# Patient Record
Sex: FEMALE | HISPANIC OR LATINO | Employment: FULL TIME | URBAN - METROPOLITAN AREA
[De-identification: names, ages, dates, MRNs, and addresses within clinical notes are randomized per-mention and may not be internally consistent; named-entity substitution may affect disease eponyms.]

---

## 2018-01-05 ENCOUNTER — NON-PROVIDER VISIT (OUTPATIENT)
Dept: OBGYN | Facility: CLINIC | Age: 28
End: 2018-01-05
Payer: MEDICAID

## 2018-01-05 DIAGNOSIS — Z32.01 PREGNANCY EXAMINATION OR TEST, POSITIVE RESULT: ICD-10-CM

## 2018-01-05 LAB
INT CON NEG: NEGATIVE
INT CON POS: POSITIVE
POC URINE PREGNANCY TEST: POSITIVE

## 2018-01-05 PROCEDURE — 81025 URINE PREGNANCY TEST: CPT | Performed by: OBSTETRICS & GYNECOLOGY

## 2018-01-23 ENCOUNTER — INITIAL PRENATAL (OUTPATIENT)
Dept: OBGYN | Facility: CLINIC | Age: 28
End: 2018-01-23
Payer: MEDICAID

## 2018-01-23 VITALS
DIASTOLIC BLOOD PRESSURE: 58 MMHG | SYSTOLIC BLOOD PRESSURE: 110 MMHG | BODY MASS INDEX: 22.15 KG/M2 | WEIGHT: 125 LBS | HEIGHT: 63 IN

## 2018-01-23 DIAGNOSIS — N93.8 DUB (DYSFUNCTIONAL UTERINE BLEEDING): ICD-10-CM

## 2018-01-23 PROCEDURE — 76830 TRANSVAGINAL US NON-OB: CPT | Performed by: OBSTETRICS & GYNECOLOGY

## 2018-01-23 PROCEDURE — 99203 OFFICE O/P NEW LOW 30 MIN: CPT | Mod: 25 | Performed by: OBSTETRICS & GYNECOLOGY

## 2018-01-23 NOTE — PROGRESS NOTES
"Confirmation of pregnancy;    Melissa Le is a 27 y.o.  here for dysfunctional uterine bleeding. Patient denies bleeding or cramps.    /58   Ht 1.6 m (5' 3\")   Wt 56.7 kg (125 lb)   BMI 22.14 kg/m²     Transvaginal ultrasound; as performed and read by myself; intrauterine gestational sac containing dotson pregnancy positive cardiac and fetal motion, crown-rump length consistent with 8 weeks 4 days, EDC 18, no free pelvic fluid, no adnexal masses        Impression;  Viable pregnancy 8 weeks 4 days    Plan;  Followup with nurse midwife in 2 weeks for initial OB            "

## 2018-01-24 ENCOUNTER — TELEPHONE (OUTPATIENT)
Dept: OBGYN | Facility: CLINIC | Age: 28
End: 2018-01-24

## 2018-01-24 NOTE — TELEPHONE ENCOUNTER
Returned pt's phone call. (Pt had  done yesterday 01/23/2018 atT). Pt stated that she is having symptoms of a yeast infection, stated having itching and white cottage cheeses discharge. Stated she had yeast infection in 06/2017 and she treated with over thecounter Monistat 7 cream. Pt wanted to know if it is safe to treated now that she is pregnant. Per consult with Aure Henley D.N.P. it is safe to treat in pregnancy with Monistat 7 cream. Instructions given to pt. I also Informed pt that if her symptoms do not improve to give us a call back. Pt verbalized understanding and had no further questions.

## 2018-02-14 ENCOUNTER — INITIAL PRENATAL (OUTPATIENT)
Dept: OBGYN | Facility: CLINIC | Age: 28
End: 2018-02-14
Payer: MEDICAID

## 2018-02-14 ENCOUNTER — HOSPITAL ENCOUNTER (OUTPATIENT)
Facility: MEDICAL CENTER | Age: 28
End: 2018-02-14
Attending: NURSE PRACTITIONER
Payer: COMMERCIAL

## 2018-02-14 VITALS — DIASTOLIC BLOOD PRESSURE: 60 MMHG | WEIGHT: 126 LBS | SYSTOLIC BLOOD PRESSURE: 90 MMHG | BODY MASS INDEX: 22.32 KG/M2

## 2018-02-14 DIAGNOSIS — Z34.81 ENCOUNTER FOR SUPERVISION OF OTHER NORMAL PREGNANCY IN FIRST TRIMESTER: ICD-10-CM

## 2018-02-14 DIAGNOSIS — Z34.81 ENCOUNTER FOR SUPERVISION OF OTHER NORMAL PREGNANCY IN FIRST TRIMESTER: Primary | ICD-10-CM

## 2018-02-14 DIAGNOSIS — Z34.81 ENCOUNTER FOR SUPERVISION OF OTHER NORMAL PREGNANCY, FIRST TRIMESTER: ICD-10-CM

## 2018-02-14 PROBLEM — Z34.91 ENCOUNTER FOR SUPERVISION OF NORMAL PREGNANCY IN FIRST TRIMESTER: Status: ACTIVE | Noted: 2018-02-14

## 2018-02-14 LAB
APPEARANCE UR: NORMAL
BILIRUB UR STRIP-MCNC: NORMAL MG/DL
COLOR UR AUTO: NORMAL
GLUCOSE UR STRIP.AUTO-MCNC: NEGATIVE MG/DL
KETONES UR STRIP.AUTO-MCNC: NEGATIVE MG/DL
LEUKOCYTE ESTERASE UR QL STRIP.AUTO: NEGATIVE
NITRITE UR QL STRIP.AUTO: NEGATIVE
PH UR STRIP.AUTO: 7.5 [PH] (ref 5–8)
PROT UR QL STRIP: NORMAL MG/DL
RBC UR QL AUTO: NEGATIVE
SP GR UR STRIP.AUTO: 1.01
UROBILINOGEN UR STRIP-MCNC: NORMAL MG/DL

## 2018-02-14 PROCEDURE — 59401 PR NEW OB VISIT: CPT | Performed by: NURSE PRACTITIONER

## 2018-02-14 PROCEDURE — 87591 N.GONORRHOEAE DNA AMP PROB: CPT

## 2018-02-14 PROCEDURE — 81002 URINALYSIS NONAUTO W/O SCOPE: CPT | Performed by: NURSE PRACTITIONER

## 2018-02-14 PROCEDURE — 88175 CYTOPATH C/V AUTO FLUID REDO: CPT

## 2018-02-14 PROCEDURE — 87491 CHLMYD TRACH DNA AMP PROBE: CPT

## 2018-02-14 ASSESSMENT — ENCOUNTER SYMPTOMS
MUSCULOSKELETAL NEGATIVE: 1
PSYCHIATRIC NEGATIVE: 1
CARDIOVASCULAR NEGATIVE: 1
RESPIRATORY NEGATIVE: 1
GASTROINTESTINAL NEGATIVE: 1
NEUROLOGICAL NEGATIVE: 1
EYES NEGATIVE: 1
CONSTITUTIONAL NEGATIVE: 1

## 2018-02-14 NOTE — PROGRESS NOTES
Pt. Here for NOB visit today.  # [phone]  First prenatal care  Pt. States having green discharge  Pharmacy verified  Pt desires AFP at 15 weeks  Pt states got flu vaccine at place of work in 2017   Pt last pap was early 2017, pt states neg  Pt would like to think about the CF testing.

## 2018-02-14 NOTE — LETTER
Cystic Fibrosis Carrier Testing  Melissa Le    The following information is about a blood test that can be done to determine if you and/or your partner carry the gene for cystic fibrosis.    WHAT IS CYSTIC FIBROSIS?  · Cystic fibrosis (CF) is an inherited disease that affects more than 25,000 American children and young adults.  · Symptoms of CF vary but include lung congestion, pneumonia, diarrhea and poor growth.  Most people with CF have severe medical problems and some die at a young age.  Others have so few symptoms they are unaware they have CF.  · CF does not affect intelligence.  · Although there is no cure for CF at this time, scientists are making progress in improving treatment and in searching for a cure.  In the past many people with CF  at a very young age.  Today, many are living into their 20’s and 30’s.    IS THERE A CHANCE MY BABY COULD HAVE CYSTIC FIBROSIS?  · You can have a child with CF even if there is no history in your family (see chart below).  · CF testing can help determine if you are a carrier and at risk to have a child with CF.  Note: if both parents are carriers, there is a 1 in 4 (25%) chance with each pregnancy that they will have a child with CF.  · Carriers have one normal CF gene and one altered CF gene.  · People with CF have two altered CF genes.  · Most people have two normal copies of the CF gene.    Approximate risk that a couple with no family history of cystic fibrosis will have a child with cystic fibrosis:    Ethnic background / Risk     couple:  1 in 2,500   couple:  1 in 15,000            couple:  1 in 8,000     American couple:  1 in 32,000     WHAT TESTING IS AVAILABLE?  · There is a blood test that can be done to find out if you or your partner is a carrier.  · It is important to understand that CF carrier testing does not detect all CF carriers.  · If the test shows that you are both CF carriers, you  unborn baby can be tested to find out if the baby has CF.    HOW MUCH DOES IT COST TO HAVE CYSTIC FIBROSIS CARRIER TESTING?  · Cost and insurance coverage for CF carrier testing vary depending upon the laboratory used and your insurance policy.  · The average cost for CF carrier testing is $300 per person.  · Your genetic counselor can provide you with more information about cystic fibrosis carrier testing.    _____  Yes, I am interested in discussing carrier testing with a genetic counselor.    _____  No, I am not interested in CF carrier testing or in receiving more information about CF carrier testing.      Client signature: ________________________________________  2/14/2018

## 2018-02-14 NOTE — PROGRESS NOTES
Subjective:      S:  Melissa Le is a 27 y.o.  female  @ EGA: 11w6d NIGEL: Estimated Date of Delivery: 18  per LMP who presents for her new OB exam.  She reports dicharge that is white and mucus-y, no burning or itching.  Desires AFP.  Considering CF.  Reports no FM, VB, LOF, or cramping.  Denies dysuria.  Pt is  and lives with FOB. Works as dietary aide.  Pregnancy is desired.    Declines Centering Pregnancy.    O:    Vitals:    18 1304   BP: (!) 90/60   Weight: 57.2 kg (126 lb)    See H&P Prenatal Physical.  Wet mount: negative        FHTs: 160        Fundal ht: 11cm     A:   1.  IUP @ 11w6d NIGEL: Estimated Date of Delivery: 18 per LMP         2.  S=D        3.    Patient Active Problem List    Diagnosis Date Noted   • Encounter for supervision of normal pregnancy in first trimester 2018         P:  1.  GC/CT done. Pap done.         2.  Prenatal labs ordered - lab slip given        3.  Discussed PNV, diet, and adequate water intake        4.  NOB packet given        5.  Return to office in 4 wks        6.  Complete OB US in 8-9 wks          HPI    Review of Systems   Constitutional: Negative.    HENT: Negative.    Eyes: Negative.    Respiratory: Negative.    Cardiovascular: Negative.    Gastrointestinal: Negative.    Genitourinary: Negative.         Uterus enlarged   Musculoskeletal: Negative.    Skin: Negative.    Neurological: Negative.    Endo/Heme/Allergies: Negative.    Psychiatric/Behavioral: Negative.           Objective:     BP (!) 90/60   Wt 57.2 kg (126 lb)   LMP 2017 (Approximate)   BMI 22.32 kg/m²      Physical Exam   Constitutional: She is oriented to person, place, and time. She appears well-developed and well-nourished.   HENT:   Head: Normocephalic and atraumatic.   Eyes: Pupils are equal, round, and reactive to light.   Neck: Normal range of motion. Neck supple.   Cardiovascular: Normal rate, regular rhythm and normal heart  sounds.    Pulmonary/Chest: Effort normal and breath sounds normal.   Abdominal: Soft.   Genitourinary: Vagina normal and uterus normal.   Musculoskeletal: Normal range of motion.   Neurological: She is alert and oriented to person, place, and time. She has normal reflexes.   Skin: Skin is warm and dry.   Psychiatric: She has a normal mood and affect. Her behavior is normal. Judgment and thought content normal.   Nursing note and vitals reviewed.              Assessment/Plan:     1. Encounter for supervision of other normal pregnancy in first trimester    - PREG CNTR PRENATAL PN; Future  - THINPREP RFLX HPV ASCUS W/CTNG; Future  - US-OB 2ND 3RD TRI COMPLETE; Future    2. Encounter for supervision of other normal pregnancy, first trimester    - POCT Urinalysis

## 2018-02-15 LAB
C TRACH DNA GENITAL QL NAA+PROBE: NEGATIVE
CYTOLOGY REG CYTOL: NORMAL
N GONORRHOEA DNA GENITAL QL NAA+PROBE: NEGATIVE
SPECIMEN SOURCE: NORMAL

## 2018-02-20 ENCOUNTER — HOSPITAL ENCOUNTER (OUTPATIENT)
Dept: LAB | Facility: MEDICAL CENTER | Age: 28
End: 2018-02-20
Attending: NURSE PRACTITIONER
Payer: COMMERCIAL

## 2018-02-20 DIAGNOSIS — Z34.81 ENCOUNTER FOR SUPERVISION OF OTHER NORMAL PREGNANCY IN FIRST TRIMESTER: ICD-10-CM

## 2018-02-20 LAB
ABO GROUP BLD: NORMAL
APPEARANCE UR: ABNORMAL
BACTERIA #/AREA URNS HPF: ABNORMAL /HPF
BASOPHILS # BLD AUTO: 0.8 % (ref 0–1.8)
BASOPHILS # BLD: 0.06 K/UL (ref 0–0.12)
BILIRUB UR QL STRIP.AUTO: NEGATIVE
BLD GP AB SCN SERPL QL: NORMAL
COLOR UR: YELLOW
CULTURE IF INDICATED INDCX: YES UA CULTURE
EOSINOPHIL # BLD AUTO: 0.11 K/UL (ref 0–0.51)
EOSINOPHIL NFR BLD: 1.4 % (ref 0–6.9)
EPI CELLS #/AREA URNS HPF: ABNORMAL /HPF
ERYTHROCYTE [DISTWIDTH] IN BLOOD BY AUTOMATED COUNT: 41.4 FL (ref 35.9–50)
GLUCOSE UR STRIP.AUTO-MCNC: NEGATIVE MG/DL
HBV SURFACE AG SER QL: NEGATIVE
HCT VFR BLD AUTO: 37.8 % (ref 37–47)
HGB BLD-MCNC: 12.5 G/DL (ref 12–16)
HIV 1+2 AB+HIV1 P24 AG SERPL QL IA: NON REACTIVE
HYALINE CASTS #/AREA URNS LPF: ABNORMAL /LPF
IMM GRANULOCYTES # BLD AUTO: 0.03 K/UL (ref 0–0.11)
IMM GRANULOCYTES NFR BLD AUTO: 0.4 % (ref 0–0.9)
KETONES UR STRIP.AUTO-MCNC: NEGATIVE MG/DL
LEUKOCYTE ESTERASE UR QL STRIP.AUTO: ABNORMAL
LYMPHOCYTES # BLD AUTO: 1.33 K/UL (ref 1–4.8)
LYMPHOCYTES NFR BLD: 17.4 % (ref 22–41)
MCH RBC QN AUTO: 29.6 PG (ref 27–33)
MCHC RBC AUTO-ENTMCNC: 33.1 G/DL (ref 33.6–35)
MCV RBC AUTO: 89.6 FL (ref 81.4–97.8)
MICRO URNS: ABNORMAL
MONOCYTES # BLD AUTO: 0.58 K/UL (ref 0–0.85)
MONOCYTES NFR BLD AUTO: 7.6 % (ref 0–13.4)
NEUTROPHILS # BLD AUTO: 5.53 K/UL (ref 2–7.15)
NEUTROPHILS NFR BLD: 72.4 % (ref 44–72)
NITRITE UR QL STRIP.AUTO: NEGATIVE
NRBC # BLD AUTO: 0 K/UL
NRBC BLD-RTO: 0 /100 WBC
PH UR STRIP.AUTO: 6 [PH]
PLATELET # BLD AUTO: 262 K/UL (ref 164–446)
PMV BLD AUTO: 10.5 FL (ref 9–12.9)
PROT UR QL STRIP: NEGATIVE MG/DL
RBC # BLD AUTO: 4.22 M/UL (ref 4.2–5.4)
RBC # URNS HPF: ABNORMAL /HPF
RBC UR QL AUTO: NEGATIVE
RH BLD: NORMAL
RUBV AB SER QL: 12.2 IU/ML
SP GR UR STRIP.AUTO: 1.02
TREPONEMA PALLIDUM IGG+IGM AB [PRESENCE] IN SERUM OR PLASMA BY IMMUNOASSAY: NON REACTIVE
UROBILINOGEN UR STRIP.AUTO-MCNC: 0.2 MG/DL
WBC # BLD AUTO: 7.6 K/UL (ref 4.8–10.8)
WBC #/AREA URNS HPF: ABNORMAL /HPF

## 2018-02-20 PROCEDURE — 36415 COLL VENOUS BLD VENIPUNCTURE: CPT

## 2018-02-20 PROCEDURE — 85025 COMPLETE CBC W/AUTO DIFF WBC: CPT

## 2018-02-20 PROCEDURE — 86901 BLOOD TYPING SEROLOGIC RH(D): CPT

## 2018-02-20 PROCEDURE — 86780 TREPONEMA PALLIDUM: CPT

## 2018-02-20 PROCEDURE — 87340 HEPATITIS B SURFACE AG IA: CPT

## 2018-02-20 PROCEDURE — 86762 RUBELLA ANTIBODY: CPT

## 2018-02-20 PROCEDURE — 81001 URINALYSIS AUTO W/SCOPE: CPT

## 2018-02-20 PROCEDURE — 86850 RBC ANTIBODY SCREEN: CPT

## 2018-02-20 PROCEDURE — 86900 BLOOD TYPING SEROLOGIC ABO: CPT

## 2018-02-20 PROCEDURE — 87086 URINE CULTURE/COLONY COUNT: CPT

## 2018-02-20 PROCEDURE — 87389 HIV-1 AG W/HIV-1&-2 AB AG IA: CPT

## 2018-02-22 LAB
BACTERIA UR CULT: ABNORMAL
BACTERIA UR CULT: ABNORMAL
SIGNIFICANT IND 70042: ABNORMAL
SITE SITE: ABNORMAL
SOURCE SOURCE: ABNORMAL

## 2018-03-14 ENCOUNTER — ROUTINE PRENATAL (OUTPATIENT)
Dept: OBGYN | Facility: CLINIC | Age: 28
End: 2018-03-14
Payer: MEDICAID

## 2018-03-14 VITALS — BODY MASS INDEX: 23.38 KG/M2 | DIASTOLIC BLOOD PRESSURE: 58 MMHG | WEIGHT: 132 LBS | SYSTOLIC BLOOD PRESSURE: 96 MMHG

## 2018-03-14 DIAGNOSIS — Z34.82 ENCOUNTER FOR SUPERVISION OF OTHER NORMAL PREGNANCY IN SECOND TRIMESTER: ICD-10-CM

## 2018-03-14 PROBLEM — Z34.92 ENCOUNTER FOR SUPERVISION OF NORMAL PREGNANCY IN SECOND TRIMESTER: Status: ACTIVE | Noted: 2018-02-14

## 2018-03-14 PROCEDURE — 90040 PR PRENATAL FOLLOW UP: CPT | Performed by: NURSE PRACTITIONER

## 2018-03-14 ASSESSMENT — PATIENT HEALTH QUESTIONNAIRE - PHQ9: CLINICAL INTERPRETATION OF PHQ2 SCORE: 0

## 2018-03-14 NOTE — PROGRESS NOTES
Ob f/u. +   No VB, LOF or contractions   C/O pt no problems today   Phone number # [phone]  Pharmacy verified with patient  WT= 132 lbs             BP=96/58  US scheduled 4/17/2018 10:00 am

## 2018-03-14 NOTE — PROGRESS NOTES
SUBJECTIVE:  Pt is a 27 y.o.   at 15w6d  gestation. Presents today for follow-up prenatal care. Reports no issues at this time.  Reports fetal movement. Denies cramping/contractions, bleeding or leaking of fluid. Denies dysuria, headaches, N/V, or other issues at this time. Generally feels well today.     OBJECTIVE:  - See prenatal vitals flow  Vitals:    18 0950   BP: (!) 96/58   Weight: 59.9 kg (132 lb)                 ASSESSMENT:   - IUP at 15w6d by LMP which is consistent with 8 week US   - S=D  Patient Active Problem List    Diagnosis Date Noted   • Encounter for supervision of normal pregnancy in second trimester 2018         PLAN:  - AFP ordered  - US scheduled   - S/sx pregnancy and labor warning signs vs general discomforts discussed  - Fetal movements and kick counts reviewed   - Adequate hydration reinforced  - Nutrition/exercise/vitamin education; continued PNV  - S/p Flu vacc at job   - Anticipatory guidance given  - RTC in 4 weeks for follow-up prenatal care

## 2018-03-15 ENCOUNTER — HOSPITAL ENCOUNTER (OUTPATIENT)
Dept: LAB | Facility: MEDICAL CENTER | Age: 28
End: 2018-03-15
Attending: NURSE PRACTITIONER
Payer: COMMERCIAL

## 2018-03-15 DIAGNOSIS — Z34.82 ENCOUNTER FOR SUPERVISION OF OTHER NORMAL PREGNANCY IN SECOND TRIMESTER: ICD-10-CM

## 2018-03-15 PROCEDURE — 36415 COLL VENOUS BLD VENIPUNCTURE: CPT

## 2018-03-15 PROCEDURE — 81511 FTL CGEN ABNOR FOUR ANAL: CPT

## 2018-03-20 LAB
# FETUSES US: NORMAL
AFP MOM SERPL: 0.7
AFP SERPL-MCNC: 25 NG/ML
AGE - REPORTED: 26.8 YR
CURRENT SMOKER: NO
FAMILY MEMBER DISEASES HX: NO
GA METHOD: NORMAL
GA: NORMAL WK
HCG MOM SERPL: 0.72
HCG SERPL-ACNC: NORMAL IU/L
HX OF HEREDITARY DISORDERS: NO
IDDM PATIENT QL: NO
INHIBIN A MOM SERPL: 0.44
INHIBIN A SERPL-MCNC: 79 PG/ML
INTEGRATED SCN PATIENT-IMP: NORMAL
PATHOLOGY STUDY: NORMAL
SPECIMEN DRAWN SERPL: NORMAL
U ESTRIOL MOM SERPL: 1
U ESTRIOL SERPL-MCNC: 0.91 NG/ML

## 2018-03-21 ENCOUNTER — TELEPHONE (OUTPATIENT)
Dept: OBGYN | Facility: CLINIC | Age: 28
End: 2018-03-21

## 2018-03-21 NOTE — TELEPHONE ENCOUNTER
Pt called triage line requesting AFP lab results. AFP was reviewed by Kira De La Paz C.N.M. On 3/20/18. Called pt back and advised her that the results were WNL. Pt had no further questions or concerns.

## 2018-04-11 ENCOUNTER — ROUTINE PRENATAL (OUTPATIENT)
Dept: OBGYN | Facility: CLINIC | Age: 28
End: 2018-04-11

## 2018-04-11 VITALS — BODY MASS INDEX: 23.91 KG/M2 | SYSTOLIC BLOOD PRESSURE: 98 MMHG | DIASTOLIC BLOOD PRESSURE: 60 MMHG | WEIGHT: 135 LBS

## 2018-04-11 DIAGNOSIS — Z34.82 ENCOUNTER FOR SUPERVISION OF OTHER NORMAL PREGNANCY IN SECOND TRIMESTER: ICD-10-CM

## 2018-04-11 PROCEDURE — 90040 PR PRENATAL FOLLOW UP: CPT | Performed by: NURSE PRACTITIONER

## 2018-04-11 NOTE — PROGRESS NOTES
Ob f/u. + fetal movement good  No VB, LOF or contractions   C/O lower back pain. Pt states she is not taking plenty water   Phone number # [phone]  Pharmacy verified with patient  QF=927 lbs              BP=98/60  US scheduled on 04/17/2018 10:00 am

## 2018-04-11 NOTE — PROGRESS NOTES
S) Pt is a 27 y.o.   at 19w6d  gestation. Routine prenatal care today. No complaints today. Discussed adequate hydration during pregnancy as she feels like she is not drinking enough. Has US scheduled 18. Discussed glucose testing next visit.  labor precautions discussed, all questions answered.    Fetal movement Normal  Cramping no  VB no  LOF no   Denies dysuria. Generally feels well today. Good self-care activities identified. Denies headaches, swelling, visual changes, or epigastric pain .     O) Blood pressure (!) 98/60, weight 61.2 kg (135 lb), last menstrual period 2017, unknown if currently breastfeeding.        Labs:       PNL: WNL       GCT: Too early       AFP: normal       GBS: N/A       Pertinent ultrasound -        Scheduled 18    A) IUP at 19w6d       S=D         Patient Active Problem List    Diagnosis Date Noted   • Encounter for supervision of normal pregnancy in second trimester 2018          SVE: deferred         TDAP: no       FLU: yes        BTL: no       : n/a       C/S Consent: n/a       IOL or C/S scheduled: no       LAST PAP: 18- Negative         P) s/s ptl vs general discomforts. Fetal movements reviewed. General ed and anticipatory guidance. Nutrition/exercise/vitamin. Plans breast Plans pp contraception- unsure  Continue PNV.

## 2018-04-17 ENCOUNTER — DATING (OUTPATIENT)
Dept: OBGYN | Facility: CLINIC | Age: 28
End: 2018-04-17

## 2018-04-17 ENCOUNTER — APPOINTMENT (OUTPATIENT)
Dept: RADIOLOGY | Facility: IMAGING CENTER | Age: 28
End: 2018-04-17
Attending: NURSE PRACTITIONER
Payer: COMMERCIAL

## 2018-04-17 DIAGNOSIS — Z34.81 ENCOUNTER FOR SUPERVISION OF OTHER NORMAL PREGNANCY IN FIRST TRIMESTER: ICD-10-CM

## 2018-04-17 DIAGNOSIS — O35.HXX0 SHORT FEMUR OF FETUS ON PRENATAL ULTRASOUND: ICD-10-CM

## 2018-04-17 PROCEDURE — 76805 OB US >/= 14 WKS SNGL FETUS: CPT | Mod: 26 | Performed by: OBSTETRICS & GYNECOLOGY

## 2018-05-09 ENCOUNTER — ROUTINE PRENATAL (OUTPATIENT)
Dept: OBGYN | Facility: CLINIC | Age: 28
End: 2018-05-09
Payer: COMMERCIAL

## 2018-05-09 VITALS — WEIGHT: 139 LBS | BODY MASS INDEX: 24.62 KG/M2 | SYSTOLIC BLOOD PRESSURE: 106 MMHG | DIASTOLIC BLOOD PRESSURE: 62 MMHG

## 2018-05-09 DIAGNOSIS — O35.HXX0 SHORT FEMUR OF FETUS ON PRENATAL ULTRASOUND: ICD-10-CM

## 2018-05-09 DIAGNOSIS — Z34.82 ENCOUNTER FOR SUPERVISION OF OTHER NORMAL PREGNANCY IN SECOND TRIMESTER: Primary | ICD-10-CM

## 2018-05-09 PROCEDURE — 90040 PR PRENATAL FOLLOW UP: CPT | Performed by: NURSE PRACTITIONER

## 2018-05-09 NOTE — PROGRESS NOTES
OB f/u. + fetal movement.  No VB, LOF or UC's.  Good phone # [phone]  3rd trimester lab orders pended and instructions given to patient   Pt concerned about a lump above belly button

## 2018-05-09 NOTE — PROGRESS NOTES
SUBJECTIVE:  Pt is a 27 y.o.   at 23w6d  gestation. Presents today for follow-up prenatal care. Reports no issues at this time.  Reports good  fetal movement. Denies cramping/contractions, bleeding or leaking of fluid. Denies dysuria, headaches, N/V, or other issues at this time. Generally feels well today.     OBJECTIVE:  - See prenatal vitals flow  -   Vitals:    18 1122   BP: 106/62   Weight: 63 kg (139 lb)      Labs - normal prenatal panel. Normal AfP  US - normal fetal survey            ASSESSMENT:   - IUP at 23w6d    - S=D   -   Patient Active Problem List    Diagnosis Date Noted   • Encounter for supervision of normal pregnancy in second trimester 2018     Priority: Medium   • Short femur of fetus on prenatal ultrasound 2018         PLAN:  - S/sx pregnancy and labor warning signs vs general discomforts discussed  - Fetal movements and kick counts reviewed   - Adequate hydration reinforced  - Nutrition/exercise/vitamin education; continued PNV  -Lab slip and instructions for glucose.

## 2018-05-10 ENCOUNTER — TELEPHONE (OUTPATIENT)
Dept: OBGYN | Facility: CLINIC | Age: 28
End: 2018-05-10

## 2018-05-10 NOTE — TELEPHONE ENCOUNTER
Pt called triage line stating she would like someone to call her. Called pt pt states that she had an appt. Yesterday in our clinic and the provider went over her U/S results but she is a little concerned because she was told that the baby's has small arms and legs. Informed pt that this was just an incidental finding it doesn't mean that the baby has dwarfisms or anything and explained to pt in detail about people having different body frames per consult with Aure Wright C.N.M. Pt verbalized understanding and had no further questions or concerns. Pt instructed to keep follow up appt with [facility] on 5/29/18.

## 2018-05-29 ENCOUNTER — ROUTINE PRENATAL (OUTPATIENT)
Dept: OBGYN | Facility: CLINIC | Age: 28
End: 2018-05-29
Payer: COMMERCIAL

## 2018-05-29 VITALS — DIASTOLIC BLOOD PRESSURE: 66 MMHG | BODY MASS INDEX: 25.51 KG/M2 | SYSTOLIC BLOOD PRESSURE: 102 MMHG | WEIGHT: 144 LBS

## 2018-05-29 DIAGNOSIS — Z34.82 ENCOUNTER FOR SUPERVISION OF OTHER NORMAL PREGNANCY IN SECOND TRIMESTER: ICD-10-CM

## 2018-05-29 DIAGNOSIS — O35.HXX0 SHORT FEMUR OF FETUS ON PRENATAL ULTRASOUND: ICD-10-CM

## 2018-05-29 PROCEDURE — 90040 PR PRENATAL FOLLOW UP: CPT | Performed by: NURSE PRACTITIONER

## 2018-05-29 NOTE — PROGRESS NOTES
Ob f/u. + fetal movement good  No VB, LOF or contractions   C/O pt report no problems at this time   Phone number # [phone]  Pharmacy verified with patient  WT= 144 lbs             QC=930/66

## 2018-05-29 NOTE — PROGRESS NOTES
SUBJECTIVE:  Pt is a 27 y.o.   at 26w5d  gestation. Presents today for follow-up prenatal care. Reports no issues at this time.  Reports fetal movement. Denies cramping/contractions, bleeding or leaking of fluid. Denies dysuria, headaches, N/V, or other issues at this time. Generally feels well today.     OBJECTIVE:  - See prenatal vitals flow  -   Vitals:    18 1128   BP: 102/66   Weight: 65.3 kg (144 lb)                 ASSESSMENT:   - IUP at 26w5d    - S=D   -   Patient Active Problem List    Diagnosis Date Noted   • Encounter for supervision of normal pregnancy in second trimester 2018     Priority: Medium   • Short femur of fetus on prenatal ultrasound 2018         PLAN:  - GCT to be done asap   - S/sx pregnancy and labor warning signs vs general discomforts discussed  - Fetal movements and kick counts reviewed   - Adequate hydration reinforced  - Nutrition/exercise/vitamin education; continued PNV  - F/u US ordered for femur humerus length at 28 weeks   - FKC reviewed   - Anticipatory guidance given  - RTC in 3 weeks for follow-up prenatal care

## 2018-05-30 ENCOUNTER — TELEPHONE (OUTPATIENT)
Dept: OBGYN | Facility: CLINIC | Age: 28
End: 2018-05-30

## 2018-05-30 NOTE — TELEPHONE ENCOUNTER
-----Original Message-----  From: [email or web address] [[email or web address]]   Sent: Wednesday, May 30, 2018 1:37 PM  To: Radha Bishop <[email or web address]>  Subject: [facility] Messages from [employer]    External mail. Be mindful of links and attachments.      ===========[id number]=================  Wed 30-May-18 01:36p  ======================================  AW   CLINIC: [facility]  CALL TYPE: General Office Message  TO: Office  NM: Melissa Cadet   PH: [phone]   PT NM: Melissa Cadet   : 91   REG DR:    RE: 27 wks, questions on ultrasounds.      --------------------------------------  Message History  Account: [id number]  Taken:  Wed 30-May-2018  1:36p   Serial#: [id number]  ===========[id number]    Pt had questions regarding baby growth, was notified that legs aren't growing appropriate as the rest of the body.  Was notified that this doesn't mean that there is any problem with baby and some times babies growth inappropriate but they catch up as pregnancy advances, she needs to keep U/S apt. Next U/S will tell us how baby is growing.   Verbalized understanding.

## 2018-06-06 ENCOUNTER — HOSPITAL ENCOUNTER (OUTPATIENT)
Dept: LAB | Facility: MEDICAL CENTER | Age: 28
End: 2018-06-06
Attending: NURSE PRACTITIONER
Payer: COMMERCIAL

## 2018-06-06 DIAGNOSIS — Z34.82 ENCOUNTER FOR SUPERVISION OF OTHER NORMAL PREGNANCY IN SECOND TRIMESTER: ICD-10-CM

## 2018-06-06 LAB
GLUCOSE 1H P 50 G GLC PO SERPL-MCNC: 100 MG/DL (ref 70–139)
HCT VFR BLD AUTO: 35.3 % (ref 37–47)
HGB BLD-MCNC: 11.6 G/DL (ref 12–16)
TREPONEMA PALLIDUM IGG+IGM AB [PRESENCE] IN SERUM OR PLASMA BY IMMUNOASSAY: NON REACTIVE

## 2018-06-06 PROCEDURE — 36415 COLL VENOUS BLD VENIPUNCTURE: CPT

## 2018-06-06 PROCEDURE — 85014 HEMATOCRIT: CPT

## 2018-06-06 PROCEDURE — 86780 TREPONEMA PALLIDUM: CPT

## 2018-06-06 PROCEDURE — 82950 GLUCOSE TEST: CPT

## 2018-06-06 PROCEDURE — 85018 HEMOGLOBIN: CPT

## 2018-06-11 ENCOUNTER — TELEPHONE (OUTPATIENT)
Dept: OBGYN | Facility: CLINIC | Age: 28
End: 2018-06-11

## 2018-06-11 ENCOUNTER — DATING (OUTPATIENT)
Dept: OBGYN | Facility: CLINIC | Age: 28
End: 2018-06-11

## 2018-06-11 ENCOUNTER — HOSPITAL ENCOUNTER (OUTPATIENT)
Dept: RADIOLOGY | Facility: MEDICAL CENTER | Age: 28
End: 2018-06-11
Attending: NURSE PRACTITIONER
Payer: COMMERCIAL

## 2018-06-11 DIAGNOSIS — O35.HXX0 SHORT FEMUR OF FETUS ON PRENATAL ULTRASOUND: ICD-10-CM

## 2018-06-11 DIAGNOSIS — Z34.82 ENCOUNTER FOR SUPERVISION OF OTHER NORMAL PREGNANCY IN SECOND TRIMESTER: ICD-10-CM

## 2018-06-11 PROCEDURE — 76815 OB US LIMITED FETUS(S): CPT

## 2018-06-11 NOTE — TELEPHONE ENCOUNTER
Pt called requesting U/S results, was notified that they are no ready and is going to take 2-3 business days to get results and get revised by provider.  We will call her if there is something to f/u other wise to review next visit.   Verbalized understanding.

## 2018-06-13 ENCOUNTER — TELEPHONE (OUTPATIENT)
Dept: OBGYN | Facility: CLINIC | Age: 28
End: 2018-06-13

## 2018-06-13 NOTE — TELEPHONE ENCOUNTER
Pt would like to know u/s results.  Was notified that needs to be review for MD to e able to get her a report.  I will call her back after MD review them.

## 2018-06-19 ENCOUNTER — ROUTINE PRENATAL (OUTPATIENT)
Dept: OBGYN | Facility: CLINIC | Age: 28
End: 2018-06-19
Payer: COMMERCIAL

## 2018-06-19 VITALS — SYSTOLIC BLOOD PRESSURE: 106 MMHG | WEIGHT: 149 LBS | DIASTOLIC BLOOD PRESSURE: 60 MMHG | BODY MASS INDEX: 26.39 KG/M2

## 2018-06-19 DIAGNOSIS — O35.HXX0 SHORT FEMUR OF FETUS ON PRENATAL ULTRASOUND: ICD-10-CM

## 2018-06-19 DIAGNOSIS — Z34.82 ENCOUNTER FOR SUPERVISION OF OTHER NORMAL PREGNANCY IN SECOND TRIMESTER: Primary | ICD-10-CM

## 2018-06-19 PROCEDURE — 90471 IMMUNIZATION ADMIN: CPT | Performed by: NURSE PRACTITIONER

## 2018-06-19 PROCEDURE — 90715 TDAP VACCINE 7 YRS/> IM: CPT | Performed by: NURSE PRACTITIONER

## 2018-06-19 PROCEDURE — 90040 PR PRENATAL FOLLOW UP: CPT | Performed by: NURSE PRACTITIONER

## 2018-06-19 NOTE — LETTER
"Count Your Baby's Movements  Another step to a healthy delivery    Melissa Kirkland             Dept: [phone]    How Many Weeks Pregnant? 29W5D    Date to Begin Counting: Today 06/19/2018              How to use this chart    One way for your physician to keep track of your baby's health is by knowing how often the baby moves (or \"kicks\") in your womb.  You can help your physician to do this by using this chart every day.    Every day, you should see how many hours it takes for your baby to move 10 times.  Start in the morning, as soon as you get up.    · First, write down the time your baby moves until you get to 10.  · Check off one box every time your baby moves until you get to 10.  · Write down the time you finished counting in the last column.  · Total how long it took to count up all 10 movements.  · Finally, fill in the box that shows how long this took.  After counting 10 movements, you no longer have to count any more that day.  The next morning, just start counting again as soon as you get up.    What should you call a \"movement\"?  It is hard to say, because it will feel different from one mother to another and from one pregnancy to the next.  The important thing is that you count the movements the same way throughout your pregnancy.  If you have more questions, you should ask your physician.    Count carefully every day!  SAMPLE:  Week 28    How many hours did it take to feel 10 movements?       Start  Time     1     2     3     4     5     6     7     8     9     10   Finish Time   Mon 8:20 ·  ·  ·  ·  ·  ·  ·  ·  ·  ·  11:40   Tue               Wed               Thu               Fri               Sat               Sun                 IMPORTANT: You should contact your physician if it takes more than two hours for you to feel 10 movements.  Each morning, write down the time and start to count the movements of your baby.  Keep track by checking off one box every time you feel one movement.  " "When you have felt 10 \"kicks\", write down the time you finished counting in the last column.  Then fill in the   box (over the check john) for the number of hours it took.  Be sure to read the complete instructions on the previous page.            "

## 2018-06-19 NOTE — PROGRESS NOTES
S:  Pt is  at 29w5d for routine OB follow up.  No concerns today.  Reports good FM.  Denies VB, LOF, RUCs or vaginal DC.    O:  Please see above vitals.        FHTs: 140        Fundal ht: 27 cm.        S<D, due to transverse fetal lie           A:  IUP at 29w5d  Patient Active Problem List    Diagnosis Date Noted   • Encounter for supervision of normal pregnancy in second trimester 2018     Priority: Medium   • Short femur of fetus on prenatal ultrasound 2018        P:  1.  Unsure of BTL, will ask at next visit.          2.  Instructions given on FKCs.          3.  Questions answered.          4.  Encouraged pt to tour L&D.          5.  Encourage adequate water intake.        6.   labor precautions reviewed.         7.  F/u 2 wks.        8.  TDap today.

## 2018-06-19 NOTE — PROGRESS NOTES
Pt here today for OB follow up  Reports +FM  WT: 149 lb  BP: 106/60  TERRENCE sheet given and explained today  DesiresTdap vaccine   Pt states she has been having white vaginal discharge x 2 weeks. States no itching or odor. States no other concerns.   BTL discussed with Pt. Pt would like to talk to her  first.  Good # [phone]    Tdap vaccine given. Left Deltoid. VIS given and screening check list reviewed with pt.  Tdap vaccine verified by Dennise Mccall (MA)

## 2018-07-03 ENCOUNTER — ROUTINE PRENATAL (OUTPATIENT)
Dept: OBGYN | Facility: CLINIC | Age: 28
End: 2018-07-03
Payer: COMMERCIAL

## 2018-07-03 VITALS — WEIGHT: 152 LBS | DIASTOLIC BLOOD PRESSURE: 68 MMHG | SYSTOLIC BLOOD PRESSURE: 100 MMHG | BODY MASS INDEX: 26.93 KG/M2

## 2018-07-03 DIAGNOSIS — O35.HXX0 SHORT FEMUR OF FETUS ON PRENATAL ULTRASOUND: ICD-10-CM

## 2018-07-03 DIAGNOSIS — Z34.82 ENCOUNTER FOR SUPERVISION OF OTHER NORMAL PREGNANCY IN SECOND TRIMESTER: Primary | ICD-10-CM

## 2018-07-03 PROCEDURE — 90040 PR PRENATAL FOLLOW UP: CPT | Performed by: NURSE PRACTITIONER

## 2018-07-03 NOTE — PROGRESS NOTES
S:  Pt is  at 31w5d for routine OB follow up.  No c/o today.  Reports good FM.  Denies VB, LOF, RUCs or vaginal DC.    O:  Please see above vitals.        FHTs: 130        Fundal ht: 31 cm.    A:  IUP at 31w5d  Patient Active Problem List    Diagnosis Date Noted   • Encounter for supervision of normal pregnancy in second trimester 2018     Priority: Medium   • Short femur of fetus on prenatal ultrasound 2018        P:  1.  GBS @ 35 wks.          2.  Continue FKCs.          3.  Questions answered.          4.  Encouraged pt to tour L&D.          5.  Encourage adequate water intake.        6.  F/u 2 wks.

## 2018-07-03 NOTE — PATIENT INSTRUCTIONS
P:  1.  GBS @ 35 wks.          2.  Continue FKCs.          3.  Questions answered.          4.  Encouraged pt to tour L&D.          5.  Encourage adequate water intake.        6.  F/u 2 wks.

## 2018-07-03 NOTE — PROGRESS NOTES
Pt here today for OB follow up  Pt states little cramping   Reports +  Good # [phone]  Pharmacy Confirmed.  U/S 6/11

## 2018-07-17 ENCOUNTER — ROUTINE PRENATAL (OUTPATIENT)
Dept: OBGYN | Facility: CLINIC | Age: 28
End: 2018-07-17
Payer: COMMERCIAL

## 2018-07-17 VITALS — WEIGHT: 156 LBS | SYSTOLIC BLOOD PRESSURE: 104 MMHG | DIASTOLIC BLOOD PRESSURE: 66 MMHG | BODY MASS INDEX: 27.63 KG/M2

## 2018-07-17 DIAGNOSIS — Z34.82 ENCOUNTER FOR SUPERVISION OF OTHER NORMAL PREGNANCY IN SECOND TRIMESTER: Primary | ICD-10-CM

## 2018-07-17 DIAGNOSIS — O35.HXX0 SHORT FEMUR OF FETUS ON PRENATAL ULTRASOUND: ICD-10-CM

## 2018-07-17 PROCEDURE — 90040 PR PRENATAL FOLLOW UP: CPT | Performed by: NURSE PRACTITIONER

## 2018-07-17 NOTE — PROGRESS NOTES
S:  Pt is  at 33w5d for routine OB follow up.  No concerns today.  Reports good FM.  Denies VB, LOF, RUCs or vaginal DC.    O:  Please see above vitals.        FHTs: 130        Fundal ht: 33 cm.        S=D    A:  IUP at 33w5d  Patient Active Problem List    Diagnosis Date Noted   • Encounter for supervision of normal pregnancy in second trimester 2018     Priority: Medium   • Short femur of fetus on prenatal ultrasound 2018        P:  1.  GBS @ 35 wks.          2.  Continue FKCs.          3.  Questions answered.          4.  Encouraged pt to tour L&D.          5.  Encourage adequate water intake.        6.  F/u 2 wks.        7.  Declines BTL.

## 2018-07-17 NOTE — PROGRESS NOTES
Pt here today for OB follow up  Pt states no complaints   Reports +  Good # [phone]  Pharmacy Confirmed.  Pt declines BTL.

## 2018-07-31 ENCOUNTER — ROUTINE PRENATAL (OUTPATIENT)
Dept: OBGYN | Facility: CLINIC | Age: 28
End: 2018-07-31
Payer: COMMERCIAL

## 2018-07-31 ENCOUNTER — HOSPITAL ENCOUNTER (OUTPATIENT)
Facility: MEDICAL CENTER | Age: 28
End: 2018-07-31
Attending: NURSE PRACTITIONER
Payer: COMMERCIAL

## 2018-07-31 VITALS — BODY MASS INDEX: 27.81 KG/M2 | DIASTOLIC BLOOD PRESSURE: 64 MMHG | SYSTOLIC BLOOD PRESSURE: 110 MMHG | WEIGHT: 157 LBS

## 2018-07-31 DIAGNOSIS — Z34.80 SUPERVISION OF OTHER NORMAL PREGNANCY, ANTEPARTUM: ICD-10-CM

## 2018-07-31 DIAGNOSIS — Z34.80 SUPERVISION OF OTHER NORMAL PREGNANCY, ANTEPARTUM: Primary | ICD-10-CM

## 2018-07-31 DIAGNOSIS — O35.HXX0 SHORT FEMUR OF FETUS ON PRENATAL ULTRASOUND: ICD-10-CM

## 2018-07-31 PROCEDURE — 90040 PR PRENATAL FOLLOW UP: CPT | Performed by: NURSE PRACTITIONER

## 2018-07-31 PROCEDURE — 87653 STREP B DNA AMP PROBE: CPT

## 2018-07-31 NOTE — PROGRESS NOTES
Pt here today for OB follow up  GBS to be done today  Reports +FM  WT: 157 lb  BP: 110/64  Pt states she has lower abdominal cramping and swelling of her feet. states no other concerns.   Chaperone offered, pt declines.   Good # [phone]

## 2018-07-31 NOTE — PROGRESS NOTES
SUBJECTIVE:  Pt is a 27 y.o.   at 35w5d  gestation. Presents today for follow-up prenatal care. Reports no issues at this time except for inconsistent and nonprogressive uterine cramping. Reports good  fetal movement. Denies cramping/contractions, bleeding or leaking of fluid. Denies dysuria, headaches, N/V, or other issues at this time. Generally feels well today.     OBJECTIVE:  - See prenatal vitals flow  -   Vitals:    18 0937   BP: 110/64   Weight: 71.2 kg (157 lb)      Labs - normal prenatal panel, normal glucose.   US - Narrative       2018 8:20 AM    HISTORY/REASON FOR EXAM:  Follow-up fetal humerus and femur length    TECHNIQUE/EXAM DESCRIPTION: OB limited ultrasound.    COMPARISON:  Obstetrical ultrasound 2018    FINDINGS:  Fetal Lie:  Transverse  LMP:  2017  Clinical NIGEL by LMP:  2018    Placenta (Location):  Posterior  Placenta Previa: No  Placental Grade: II    Fetal Heart Rate:  135 bpm    No maternal adnexal mass is identified.    Fetal Biometry  BPD    7.38 cm, 29w 4d  HC    27.00 cm, 29w 3d  AC    24.58 cm, 28w 6d  Femur Length    5.07 cm, 27w 1d  Humerus Length    4.63 cm, 27w 3d    EGA by this US:  28w 3d  NIGEL by this US: 2018    Estimated Fetal Weight:  1220 g    Comments:   Impression       Single intrauterine pregnancy of an estimated gestational age of 28w 3d with an estimated date of delivery of 2018.  Dating is concordant with prior exam.  Femur and humerus length remain delayed SPECT or other biometric parameters.    Complete fetal survey was not performed.    INTERPRETING LOCATION:  [facility], [id number]                ASSESSMENT:   - IUP at 35w5d    - S=D   -   Patient Active Problem List    Diagnosis Date Noted   • Encounter for supervision of normal pregnancy in second trimester 2018     Priority: Medium   • Short femur of fetus on prenatal ultrasound 2018         PLAN:  - S/sx pregnancy and labor warning signs vs general  discomforts discussed  - Fetal movements and kick counts reviewed   - Adequate hydration reinforced  - Nutrition/exercise/vitamin education; continued PNV  - Encouraged tour of LnD/childbirth education classes: contact info provided   - GBS done today. Start weekly visits. Ultrasound results discussed with patient.

## 2018-08-02 LAB — GP B STREP DNA SPEC QL NAA+PROBE: NEGATIVE

## 2018-08-07 ENCOUNTER — ROUTINE PRENATAL (OUTPATIENT)
Dept: OBGYN | Facility: CLINIC | Age: 28
End: 2018-08-07
Payer: COMMERCIAL

## 2018-08-07 VITALS — SYSTOLIC BLOOD PRESSURE: 114 MMHG | DIASTOLIC BLOOD PRESSURE: 74 MMHG | WEIGHT: 157 LBS | BODY MASS INDEX: 27.81 KG/M2

## 2018-08-07 DIAGNOSIS — O35.HXX0 SHORT FEMUR OF FETUS ON PRENATAL ULTRASOUND: ICD-10-CM

## 2018-08-07 DIAGNOSIS — Z34.93 ENCOUNTER FOR SUPERVISION OF NORMAL PREGNANCY IN THIRD TRIMESTER, UNSPECIFIED GRAVIDITY: ICD-10-CM

## 2018-08-07 PROCEDURE — 90040 PR PRENATAL FOLLOW UP: CPT | Performed by: NURSE PRACTITIONER

## 2018-08-07 NOTE — PROGRESS NOTES
Pt here today for OB follow up  Pt states some pressure,swelling   Reports +FM  Good # [phone]  Pharmacy Confirmed.  Chaperone offered and none needed.

## 2018-08-07 NOTE — PROGRESS NOTES
SUBJECTIVE:  Pt is a 27 y.o.   at 36w5d  gestation. Presents today for follow-up prenatal care. Reports no issues at this time.  Reports good  fetal movement. Denies cramping/contractions, bleeding or leaking of fluid. Denies dysuria, headaches, N/V, or other issues at this time. Generally feels well today.     OBJECTIVE:  - See prenatal vitals flow  -   Vitals:    18 1026   BP: 114/74   Weight: 71.2 kg (157 lb)                 ASSESSMENT:   - IUP at 36w5d    - S=D   -   Patient Active Problem List    Diagnosis Date Noted   • Encounter for supervision of normal pregnancy in third trimester 2018     Priority: Medium   • Short femur of fetus on prenatal ultrasound 2018         PLAN:  - S/sx pregnancy and labor warning signs vs general discomforts discussed  - Fetal movements and kick counts reviewed   - Adequate hydration reinforced  - Nutrition/exercise/vitamin education; continued PNV  - S/p TDAP vacc  - Anticipatory guidance given  - RTC in 1 weeks for follow-up prenatal care

## 2018-08-17 ENCOUNTER — ROUTINE PRENATAL (OUTPATIENT)
Dept: OBGYN | Facility: CLINIC | Age: 28
End: 2018-08-17
Payer: COMMERCIAL

## 2018-08-17 VITALS — DIASTOLIC BLOOD PRESSURE: 80 MMHG | BODY MASS INDEX: 28.34 KG/M2 | SYSTOLIC BLOOD PRESSURE: 100 MMHG | WEIGHT: 160 LBS

## 2018-08-17 DIAGNOSIS — O35.HXX0 SHORT FEMUR OF FETUS ON PRENATAL ULTRASOUND: ICD-10-CM

## 2018-08-17 DIAGNOSIS — Z34.93 ENCOUNTER FOR SUPERVISION OF NORMAL PREGNANCY IN THIRD TRIMESTER, UNSPECIFIED GRAVIDITY: Primary | ICD-10-CM

## 2018-08-17 PROCEDURE — 90040 PR PRENATAL FOLLOW UP: CPT | Performed by: NURSE PRACTITIONER

## 2018-08-17 NOTE — PROGRESS NOTES
S) Pt is a 27 y.o.   at 38w1d  gestation. Routine prenatal care today. No complaints today. Discussed SVE and IOL referral today. Labor precautions reviewed, all questions answered.    Fetal movement Normal  Cramping no  VB no  LOF no   Denies dysuria. Generally feels well today. Good self-care activities identified. Denies headaches, swelling, visual changes, or epigastric pain .     O) Blood pressure 100/80, weight 72.6 kg (160 lb), last menstrual period 2017, unknown if currently breastfeeding.        Labs:       PNL: WNL       GCT: 100        AFP: normal       GBS: negative       Pertinent ultrasound -        18- Survey WNL, but short femur and humerus noted. SUYAPA 15.48cm, c/w prev dating. Recommend growth follow up.  18- Growth follow up- Survey WNL, femur and humerus still delayed. SUYAPA not mentioned, c/w prev dating. No follow up indicated    A) IUP at 38w1d       S=D         Patient Active Problem List    Diagnosis Date Noted   • Encounter for supervision of normal pregnancy in third trimester 2018     Priority: Medium   • Short femur of fetus on prenatal ultrasound 2018          SVE: 2/50/-2       Chaperone offered: yes and present         TDAP: yes       FLU: no        BTL: no       : n/a       C/S Consent: n/a       IOL or C/S scheduled: no, referral placed today       LAST PAP: 18- Negative         P) s/s ptl vs general discomforts. Fetal movements reviewed. General ed and anticipatory guidance. Nutrition/exercise/vitamin. Plans breast Plans pp contraception- unsure  Continue PNV.

## 2018-08-17 NOTE — PROGRESS NOTES
Pt here for OB follow Up  Pt states some clear fluid and some [place] Mendez  +FM  Good # [phone]  Pharmacy confirmed  Pt desires to be examined.  Chaperone offered and declined  Undecided on B/C at this time

## 2018-08-21 ENCOUNTER — ROUTINE PRENATAL (OUTPATIENT)
Dept: OBGYN | Facility: CLINIC | Age: 28
End: 2018-08-21
Payer: COMMERCIAL

## 2018-08-21 VITALS — SYSTOLIC BLOOD PRESSURE: 114 MMHG | BODY MASS INDEX: 29.05 KG/M2 | WEIGHT: 164 LBS | DIASTOLIC BLOOD PRESSURE: 62 MMHG

## 2018-08-21 DIAGNOSIS — Z34.93 ENCOUNTER FOR SUPERVISION OF NORMAL PREGNANCY IN THIRD TRIMESTER, UNSPECIFIED GRAVIDITY: ICD-10-CM

## 2018-08-21 PROCEDURE — 90040 PR PRENATAL FOLLOW UP: CPT | Performed by: NURSE PRACTITIONER

## 2018-08-21 NOTE — PROGRESS NOTES
SUBJECTIVE:  Pt is a 27 y.o.   at 38w5d  gestation. Presents today for follow-up prenatal care. Reports no issues at this time.  Reports good fetal movement. Denies cramping/contractions, bleeding or leaking of fluid. Denies dysuria, headaches, N/V, or other issues at this time. Generally feels well today.     OBJECTIVE:  - See prenatal vitals flow  -   Vitals:    18 0948   BP: 114/62   Weight: 74.4 kg (164 lb)                 ASSESSMENT:   - IUP at 38w5d    - S=D   -   Patient Active Problem List    Diagnosis Date Noted   • Encounter for supervision of normal pregnancy in third trimester 2018     Priority: Medium   • Short femur of fetus on prenatal ultrasound 2018         PLAN:  - IOL placed   - S/sx pregnancy and labor warning signs vs general discomforts discussed  - Fetal movements and kick counts reviewed   - Adequate hydration reinforced  - Nutrition/exercise/vitamin education; continued PNV  - Plans for breastfeeding  - Desires pregnancy in one year, safe pregnancy spacing reviewed   - S/p TDAP vacc  - Anticipatory guidance given  - RTC in 1 weeks for follow-up prenatal care

## 2018-08-21 NOTE — PROGRESS NOTES
Pt here today for OB follow up  Pt states no complaints   Reports +  Good # [phone]  Pharmacy Confirmed.  Chaperone offered and declined.

## 2018-08-28 ENCOUNTER — ROUTINE PRENATAL (OUTPATIENT)
Dept: OBGYN | Facility: CLINIC | Age: 28
End: 2018-08-28
Payer: COMMERCIAL

## 2018-08-28 VITALS — DIASTOLIC BLOOD PRESSURE: 72 MMHG | WEIGHT: 164 LBS | BODY MASS INDEX: 29.05 KG/M2 | SYSTOLIC BLOOD PRESSURE: 118 MMHG

## 2018-08-28 DIAGNOSIS — O35.HXX0 SHORT FEMUR OF FETUS ON PRENATAL ULTRASOUND: ICD-10-CM

## 2018-08-28 DIAGNOSIS — Z34.93 ENCOUNTER FOR SUPERVISION OF NORMAL PREGNANCY IN THIRD TRIMESTER, UNSPECIFIED GRAVIDITY: ICD-10-CM

## 2018-08-28 PROCEDURE — 90040 PR PRENATAL FOLLOW UP: CPT | Performed by: NURSE PRACTITIONER

## 2018-08-28 NOTE — PROGRESS NOTES
SUBJECTIVE:  Pt is a 27 y.o.   at 39w5d  gestation. Presents today for follow-up prenatal care. Reports no issues at this time.  Reports good  fetal movement. Denies cramping/contractions, bleeding or leaking of fluid. Denies dysuria, headaches, N/V, or other issues at this time. Generally feels well today.     OBJECTIVE:  - See prenatal vitals flow  -   Vitals:    18 0938   BP: 118/72   Weight: 74.4 kg (164 lb)      Labs - normal prenatal labs  US femur and humerus length slightly delayed. Adequate growth           ASSESSMENT:   - IUP at 39w5d    - S=D   -   Patient Active Problem List    Diagnosis Date Noted   • Encounter for supervision of normal pregnancy in third trimester 2018     Priority: Medium   • Short femur of fetus on prenatal ultrasound 2018         PLAN:  - S/sx pregnancy and labor warning signs vs general discomforts discussed  - Fetal movements and kick counts reviewed   - Adequate hydration reinforced  - Nutrition/exercise/vitamin education; continued PNV  - Encouraged tour of LnD/childbirth education classes: contact info provided   - Understands IOL instructions.

## 2018-08-28 NOTE — PROGRESS NOTES
OB f/u. + fetal movement.  No VB, LOF or UC's.  Wt:164lb       BP: 118/72  Good phone # [phone]  Preferred pharmacy confirmed.  IOL 09/05 @ 8:00am. Patient notified    “Chaperone offered, patient declined”

## 2018-08-30 ENCOUNTER — TELEPHONE (OUTPATIENT)
Dept: OBGYN | Facility: CLINIC | Age: 28
End: 2018-08-30

## 2018-08-30 NOTE — TELEPHONE ENCOUNTER
Pt called LM on triage c/o vaginal discharge, pt is 40w  8/30/18 1630 I called patient back and states mucous started yesterday but it was light pink, now it's more of a bloody mucous, denies UC, LOF, reports baby moving well. I consulted with midwife Debra Rivas and recommends patient to rest and hydrate and keep an eye on it, it may be her mucous plug but if patient starts bleeding heavy, starts having UC q 3-5 min or more than 6 in an hour, LOF, baby not moving well then patient needs to go Jing&D for further evaluation. Pt verbalized understanding and will comply. Pt had no further questions or concerns.

## 2018-08-31 ENCOUNTER — HOSPITAL ENCOUNTER (INPATIENT)
Facility: MEDICAL CENTER | Age: 28
LOS: 1 days | End: 2018-09-01
Attending: OBSTETRICS & GYNECOLOGY | Admitting: OBSTETRICS & GYNECOLOGY
Payer: COMMERCIAL

## 2018-08-31 LAB
ERYTHROCYTE [DISTWIDTH] IN BLOOD BY AUTOMATED COUNT: 45.9 FL (ref 35.9–50)
HCT VFR BLD AUTO: 35.4 % (ref 37–47)
HGB BLD-MCNC: 11.5 G/DL (ref 12–16)
MCH RBC QN AUTO: 29 PG (ref 27–33)
MCHC RBC AUTO-ENTMCNC: 32.5 G/DL (ref 33.6–35)
MCV RBC AUTO: 89.4 FL (ref 81.4–97.8)
PLATELET # BLD AUTO: 198 K/UL (ref 164–446)
PMV BLD AUTO: 11.2 FL (ref 9–12.9)
RBC # BLD AUTO: 3.96 M/UL (ref 4.2–5.4)
WBC # BLD AUTO: 13.8 K/UL (ref 4.8–10.8)

## 2018-08-31 PROCEDURE — 36415 COLL VENOUS BLD VENIPUNCTURE: CPT

## 2018-08-31 PROCEDURE — 700111 HCHG RX REV CODE 636 W/ 250 OVERRIDE (IP)

## 2018-08-31 PROCEDURE — A9270 NON-COVERED ITEM OR SERVICE: HCPCS | Performed by: NURSE PRACTITIONER

## 2018-08-31 PROCEDURE — 770002 HCHG ROOM/CARE - OB PRIVATE (112)

## 2018-08-31 PROCEDURE — 700102 HCHG RX REV CODE 250 W/ 637 OVERRIDE(OP): Performed by: NURSE PRACTITIONER

## 2018-08-31 PROCEDURE — 85027 COMPLETE CBC AUTOMATED: CPT

## 2018-08-31 RX ORDER — ACETAMINOPHEN 325 MG/1
325 TABLET ORAL EVERY 4 HOURS PRN
Status: DISCONTINUED | OUTPATIENT
Start: 2018-08-31 | End: 2018-09-01 | Stop reason: HOSPADM

## 2018-08-31 RX ORDER — ACETAMINOPHEN 325 MG/1
650 TABLET ORAL EVERY 4 HOURS PRN
Status: DISCONTINUED | OUTPATIENT
Start: 2018-08-31 | End: 2018-09-01 | Stop reason: HOSPADM

## 2018-08-31 RX ORDER — OXYTOCIN 10 [USP'U]/ML
10 INJECTION, SOLUTION INTRAMUSCULAR; INTRAVENOUS
Status: DISCONTINUED | OUTPATIENT
Start: 2018-08-31 | End: 2018-08-31 | Stop reason: HOSPADM

## 2018-08-31 RX ORDER — VITAMIN A ACETATE, BETA CAROTENE, ASCORBIC ACID, CHOLECALCIFEROL, .ALPHA.-TOCOPHEROL ACETATE, DL-, THIAMINE MONONITRATE, RIBOFLAVIN, NIACINAMIDE, PYRIDOXINE HYDROCHLORIDE, FOLIC ACID, CYANOCOBALAMIN, CALCIUM CARBONATE, FERROUS FUMARATE, ZINC OXIDE, CUPRIC OXIDE 3080; 12; 120; 400; 1; 1.84; 3; 20; 22; 920; 25; 200; 27; 10; 2 [IU]/1; UG/1; MG/1; [IU]/1; MG/1; MG/1; MG/1; MG/1; MG/1; [IU]/1; MG/1; MG/1; MG/1; MG/1; MG/1
1 TABLET, FILM COATED ORAL EVERY MORNING
Status: DISCONTINUED | OUTPATIENT
Start: 2018-09-01 | End: 2018-09-01 | Stop reason: HOSPADM

## 2018-08-31 RX ORDER — DOCUSATE SODIUM 100 MG/1
100 CAPSULE, LIQUID FILLED ORAL 2 TIMES DAILY PRN
Status: DISCONTINUED | OUTPATIENT
Start: 2018-08-31 | End: 2018-09-01 | Stop reason: HOSPADM

## 2018-08-31 RX ORDER — SODIUM CHLORIDE, SODIUM LACTATE, POTASSIUM CHLORIDE, CALCIUM CHLORIDE 600; 310; 30; 20 MG/100ML; MG/100ML; MG/100ML; MG/100ML
INJECTION, SOLUTION INTRAVENOUS CONTINUOUS
Status: DISCONTINUED | OUTPATIENT
Start: 2018-08-31 | End: 2018-08-31 | Stop reason: HOSPADM

## 2018-08-31 RX ORDER — OXYCODONE HYDROCHLORIDE AND ACETAMINOPHEN 5; 325 MG/1; MG/1
2 TABLET ORAL EVERY 4 HOURS PRN
Status: DISCONTINUED | OUTPATIENT
Start: 2018-08-31 | End: 2018-09-01 | Stop reason: HOSPADM

## 2018-08-31 RX ORDER — MISOPROSTOL 200 UG/1
800 TABLET ORAL PRN
Status: DISCONTINUED | OUTPATIENT
Start: 2018-08-31 | End: 2018-09-01 | Stop reason: HOSPADM

## 2018-08-31 RX ORDER — MISOPROSTOL 200 UG/1
800 TABLET ORAL
Status: DISCONTINUED | OUTPATIENT
Start: 2018-08-31 | End: 2018-08-31 | Stop reason: HOSPADM

## 2018-08-31 RX ORDER — IBUPROFEN 800 MG/1
800 TABLET ORAL EVERY 8 HOURS PRN
Status: DISCONTINUED | OUTPATIENT
Start: 2018-08-31 | End: 2018-09-01 | Stop reason: HOSPADM

## 2018-08-31 RX ORDER — SODIUM CHLORIDE, SODIUM LACTATE, POTASSIUM CHLORIDE, CALCIUM CHLORIDE 600; 310; 30; 20 MG/100ML; MG/100ML; MG/100ML; MG/100ML
INJECTION, SOLUTION INTRAVENOUS
Status: ACTIVE
Start: 2018-08-31 | End: 2018-08-31

## 2018-08-31 RX ORDER — HYDROCODONE BITARTRATE AND ACETAMINOPHEN 5; 325 MG/1; MG/1
2 TABLET ORAL EVERY 4 HOURS PRN
Status: DISCONTINUED | OUTPATIENT
Start: 2018-08-31 | End: 2018-09-01 | Stop reason: HOSPADM

## 2018-08-31 RX ADMIN — Medication 2000 ML/HR: at 03:33

## 2018-08-31 RX ADMIN — Medication 125 ML/HR: at 04:50

## 2018-08-31 RX ADMIN — IBUPROFEN 800 MG: 800 TABLET, FILM COATED ORAL at 20:20

## 2018-08-31 RX ADMIN — IBUPROFEN 800 MG: 800 TABLET, FILM COATED ORAL at 07:50

## 2018-08-31 ASSESSMENT — PATIENT HEALTH QUESTIONNAIRE - PHQ9
1. LITTLE INTEREST OR PLEASURE IN DOING THINGS: NOT AT ALL
2. FEELING DOWN, DEPRESSED, IRRITABLE, OR HOPELESS: NOT AT ALL
2. FEELING DOWN, DEPRESSED, IRRITABLE, OR HOPELESS: NOT AT ALL
SUM OF ALL RESPONSES TO PHQ9 QUESTIONS 1 AND 2: 0
SUM OF ALL RESPONSES TO PHQ9 QUESTIONS 1 AND 2: 0
1. LITTLE INTEREST OR PLEASURE IN DOING THINGS: NOT AT ALL

## 2018-08-31 ASSESSMENT — PAIN SCALES - GENERAL
PAINLEVEL_OUTOF10: 0
PAINLEVEL_OUTOF10: 4
PAINLEVEL_OUTOF10: 4

## 2018-08-31 ASSESSMENT — LIFESTYLE VARIABLES
EVER_SMOKED: NEVER
ALCOHOL_USE: NO

## 2018-08-31 NOTE — PROGRESS NOTES
Pt arrived at 0540 with L&D RN and family. Pt denies pain at this time. Fluids running as ordered. Pt and family oriented to room and to unit. Bedside report given. Bands checked by two RNs. Assessment complete. Pt has a steady gait to the bathroom. Non slips socks on. Ice packs, spray and tucks in bathroom for pt. Educated pt about the call light and the educational videos to watch. Pt had a bagel after delivery, updated diet as ordered. No other needs at this time. Call light within reach.

## 2018-08-31 NOTE — DELIVERY NOTE
Delivery Note    PATIENT ID:  NAME:  Melissa Kirkland  MRN:               [id number]  YOB: 1990    Labor Course  Pt was admitted for active labor.  Pt was complete and pushing upon admission.      On 2018  at 03:27, this 27 y.o.,  40w1d now  , GBS negative female delivered via  under no anesthesia a viable male infant weight pending with APGAR scores of 8 and 8 at one and five minutes.   Baby to maternal abdomen.  Spontaneous cry.  Mouth and nares bulb suctioned by RN. Delayed cord clamping occurred with cord doubly clamped by myself and cut by FOB after pulsations had stopped.  Pitocin infusing in IVF.  Spontaneous delivery of placenta grossly intact @ 03:33.  CVx3. FF and bleeding small.  Upon vaginal exam, there was no laceration. Pt and infant are stable and bonding.  Lap count: n/a.  Estimated blood loss: 200.      ALKA Bermudez Dr., attending physician

## 2018-08-31 NOTE — PROGRESS NOTES
0315: report received from Saadia CHAVEZ, room being prepped for delivery   0327: : viable baby boy.   0333: delivery of placenta    Admission completed.    Recovery:     Fundus: firm/light/ at U, pt up to bathroom, voided, teodoro care performed, gown changed, pt transferred to postpartum. Report given to Arlene CHAVEZ. Bands checked.

## 2018-08-31 NOTE — H&P
History and Physical    Melissa Kirkland is a 27 y.o. female  -Para:     Gestational Age:  40w1d  Admitted for:   Active Labor  Admitted to  [facility].  [facility]    HPI: Patient is admitted with the above mentioned Chief Complaint and States   Loss of fluid:   positive  Abdominal Pain:  negative  Uterine Contractions:  positive  Vaginal Bleeding:  positive  Fetal Movement:  normal  Patient denies fever, chills, nausea, vomiting , headache, visual disturbance, or dysuria  Patient's last menstrual period was 2017 (approximate).  Estimated Date of Delivery: 18  Final NIGEL: 2018, by Last Menstrual Period    Patient Active Problem List    Diagnosis Date Noted   • Encounter for supervision of normal pregnancy in third trimester 2018     Priority: Medium   • Short femur of fetus on prenatal ultrasound 2018       Admitting DX: Pregnancy   Labor and delivery indication for care or intervention  Labor and delivery indication for care or intervention  Pregnancy Complications:  none  OB Risk Factors:   none  Labor State:    Active phase labor.    History:   has no past medical history of Addisons disease (HCC); Adrenal disorder (HCC); Allergy; Anemia; Anxiety; Blood transfusion; Blood transfusion without reported diagnosis; Clotting disorder (HCC); COPD; Cushings syndrome (HCC); Diabetic neuropathy (HCC); GERD (gastroesophageal reflux disease); Hyperlipidemia; IBD (inflammatory bowel disease); Meningitis; Migraine; Muscle disorder; OSTEOPOROSIS; Parathyroid disorder (HCC); Pituitary disease (HCC); Sickle cell disease (HCC); Substance abuse; or Ulcer.     has a past surgical history that includes mammoplasty augmentation (Bilateral, 2016) and other (2015).    OB History    Para Term  AB Living   2 2 2     2   SAB TAB Ectopic Molar Multiple Live Births             2      # Outcome Date GA Lbr  "Chivo/2nd Weight Sex Delivery Anes PTL Lv   2 Term 08/31/18 40w1d   M Vag-Spont None N TANIKA   1 Term 10/14/11 40w3d  3.487 kg (7 lb 11 oz) M Vag-Spont   TANIKA      Birth Comments: Pt. states no complications.      Obstetric Comments   Planned pregnancy. Different FOB as last child.       Medications:  No current facility-administered medications on file prior to encounter.      Current Outpatient Prescriptions on File Prior to Encounter   Medication Sig Dispense Refill   • Prenatal Multivit-Min-Fe-FA (PRENATAL VITAMINS PO) Take  by mouth.         Allergies:  Nkda [no known drug allergy]    ROS:   Neuro: negative    Cardiovascular: negative  Gastro intestinal: negative  Genitourinary: negative            Physical Exam:  /71   Pulse 93   Temp 36.6 °C (97.9 °F) (Oral)   Ht 1.626 m (5' 4\")   Wt 72.6 kg (160 lb)   LMP 11/23/2017 (Approximate)   BMI 27.46 kg/m²   Constitutional: healthy-appearing, Well-developed  No JVD: while supine  HEENT: PERRLA  Breast Exam: negative  Cardio: regular rate and rhythm, S1, S2 normal, no murmur, click, rub or gallop  Lung: unlabored respirations, no intercostal retractions or accessory muscle use  Abdomen: abdomen is soft without significant tenderness, masses, organomegaly or guarding  Extremity: extremities, peripheral pulses and reflexes normal, no edema, redness or tenderness in the calves or thighs    Cervical Exam: 100%  Cervix Dilatation: 10  Station: positive 2  Pelvis: Adequate  Fetal Assessment: Fetal heart variability: moderate  Estimated Fetal Weight: 3000 - 3500g      Labs:      Prenatal Results     1st Trimester     Test Value Date Time    ABO O  02/20/18 0945    RH POS  02/20/18 0945    Antibody NEG  02/20/18 0945    CBC/PLT/DIFF       HGB 11.6 g/dL (L) 06/06/18 1043    Platelets 262 K/uL 02/20/18 0945    HGB A1C        1 Hr  mg/dL 06/06/18 1043    3 Hr GTT       Rubella 12.20 IU/mL 02/20/18 0945    RPR Non Reactive  05/19/11 1427    Urine Culture Growth " noted after further incubation, see below for  organism identification.    (A) 18 0945      Lactobacillus species  >100,000 cfu/mL    (A) 18 0945    24 Urine Protein        24 Urine Creatinine        HBsAg Negative  18 0945    Hep CAB        HIV       Gonorrhea Negative  11 1416    Chlamydia Negative  11 1416    TSH        Free T4         TB       Pap       SYPHILUS TREP QUAL [id number] [[id number]][ Non Reactive  18 0945          2nd Trimester     Test Value Date Time    HCT 35.3 % (L) 18 1043    HGB 11.6 g/dL (L) 18 1043    1 Hr  mg/dL 18 1043    3 Hr GTT             24-28 Weeks     Test Value Date Time    1 Hr GCT  100 mg/dL 18 1043    TSH        Free T4        24 Urine Protein       24 Urine Creatinine       BUN       Creatinine       GFR       AST       ALT       Uric Acid       LDH             3rd Trimester     Test Value Date Time    HCT 35.3 % (L) 18 1043    HGB 11.6 g/dL (L) 18 1043    TSH       Free T4       24 Hr Urine Protein       24 Hr Urine Creatinine       SYPHILUS TREP QUAL Non Reactive  18 1043          35-37 Weeks     Test Value Date Time    GBS PCR LB Negative  18 1004    GBS PCR Negative  18 1004          Genetic Screening     Test Value Date Time    Cystic Fibrosis       AFP Quad Screen Neg  03/15/18 1348    Sickle Cell                     Assessment:  Gestational Age:  40w1d  Labor State:   Labor, Active  Risk Factors:   none  Pregnancy Complications: none    Patient Active Problem List    Diagnosis Date Noted   • Encounter for supervision of normal pregnancy in third trimester 2018     Priority: Medium   • Short femur of fetus on prenatal ultrasound 2018       Plan:   Admitted for: Active Labor    CBC  routine urinalysis  Antibiotics: none    TRUNG Alegre

## 2018-08-31 NOTE — PROGRESS NOTES
0311- [facility] [id number], G-2 P-1, GA 40  1/7 weeks. Pt presented to labor and delivery with UC's since yesterday afternoon. EFM and TOCO applied. Denies leaking of fluid. States she's has some bloody show. Abd soft and non tender to touch. Confirms fetal movement. POC discussed.     0315- Pt states she feels pressure and bloody show visualized. SVE ant lip/100/+2. LHitesh Bermgan CNM called for delivery. IV started to right wrist. SBAR endorsed to ANGELA Barrera RN.

## 2018-09-01 VITALS
RESPIRATION RATE: 18 BRPM | HEART RATE: 66 BPM | BODY MASS INDEX: 27.31 KG/M2 | OXYGEN SATURATION: 94 % | TEMPERATURE: 98.4 F | DIASTOLIC BLOOD PRESSURE: 75 MMHG | SYSTOLIC BLOOD PRESSURE: 118 MMHG | WEIGHT: 160 LBS | HEIGHT: 64 IN

## 2018-09-01 PROCEDURE — 36415 COLL VENOUS BLD VENIPUNCTURE: CPT

## 2018-09-01 PROCEDURE — A9270 NON-COVERED ITEM OR SERVICE: HCPCS | Performed by: NURSE PRACTITIONER

## 2018-09-01 PROCEDURE — 700102 HCHG RX REV CODE 250 W/ 637 OVERRIDE(OP): Performed by: NURSE PRACTITIONER

## 2018-09-01 PROCEDURE — 304965 HCHG RECOVERY SERVICES

## 2018-09-01 PROCEDURE — 59409 OBSTETRICAL CARE: CPT

## 2018-09-01 RX ORDER — IBUPROFEN 600 MG/1
600 TABLET ORAL EVERY 6 HOURS PRN
Qty: 30 TAB | Refills: 0 | Status: SHIPPED | OUTPATIENT
Start: 2018-09-01 | End: 2019-07-29

## 2018-09-01 RX ADMIN — IBUPROFEN 800 MG: 800 TABLET, FILM COATED ORAL at 06:22

## 2018-09-01 RX ADMIN — Medication 1 TABLET: at 06:22

## 2018-09-01 ASSESSMENT — PAIN SCALES - GENERAL
PAINLEVEL_OUTOF10: 0
PAINLEVEL_OUTOF10: 0
PAINLEVEL_OUTOF10: 4

## 2018-09-01 NOTE — PROGRESS NOTES
1450- Discharge instructions given to patient who verbalized understanding and stated she has no questions.  Rx's handed to patient after named verified.  1521- Patient stated that she is ready for discharge.  Patient discharged to home, no change noted in condition, via wheelchair with infant and FOB.

## 2018-09-01 NOTE — CARE PLAN
Problem: Communication  Goal: The ability to communicate needs accurately and effectively will improve  Outcome: PROGRESSING AS EXPECTED  Reviewed plan of care with patient who verbalized understanding.    Problem: Altered physiologic condition related to immediate post-delivery state and potential for bleeding/hemorrhage  Goal: Patient physiologically stable as evidenced by normal lochia, palpable uterine involution and vital signs within normal limits  Outcome: PROGRESSING AS EXPECTED  Fundus firm.  Lochia scant.  VSS.    Problem: Potential for postpartum infection related to presence of episiotomy/vaginal tear and/or uterine contamination  Goal: Patient will be absent from signs and symptoms of infection  Outcome: PROGRESSING AS EXPECTED

## 2018-09-01 NOTE — DISCHARGE SUMMARY
Discharge Summary:      Melissa Kirkland      Admit Date:   2018  Discharge Date:  2018     Admitting diagnosis:  Term Pregnancy in labor   Labor and delivery indication for care or intervention  Labor and delivery indication for care or intervention  Discharge Diagnosis: Status post vaginal, spontaneous. PPD 1          History:  History reviewed. No pertinent past medical history.  OB History    Para Term  AB Living   2 2 2     2   SAB TAB Ectopic Molar Multiple Live Births             2      # Outcome Date GA Lbr Chivo/2nd Weight Sex Delivery Anes PTL Lv   2 Term 18 40w1d   M Vag-Spont None N TANIKA   1 Term 10/14/11 40w3d  3.487 kg (7 lb 11 oz) M Vag-Spont   TANIKA      Birth Comments: Pt. states no complications.      Obstetric Comments   Planned pregnancy. Different FOB as last child.        Nkda [no known drug allergy]  Patient Active Problem List    Diagnosis Date Noted   • Encounter for supervision of normal pregnancy in third trimester 2018     Priority: Medium   • Short femur of fetus on prenatal ultrasound 2018        Hospital Course:   27 y.o. , now para 2, was admitted with the above mentioned diagnosis, underwent Active Labor, vaginal, spontaneous. Patient postpartum course was unremarkable, with progressive advancement in diet , ambulation and toleration of oral analgesia. Patient without complaints today and desires discharge.      Vitals:    18 0800 18 1200 18 1600 18   BP: 108/70 117/78 125/71 118/76   Pulse: 73 99 98 87   Resp:    Temp: 37.2 °C (98.9 °F) 37.1 °C (98.8 °F) 36.6 °C (97.8 °F) 37.1 °C (98.7 °F)   TempSrc:       SpO2: 94% 98% 94% 94%   Weight:       Height:           Current Facility-Administered Medications   Medication Dose   • oxytocin (PITOCIN) infusion (for postpartum)   mL/hr   • ibuprofen (MOTRIN) tablet 800 mg  800 mg   • acetaminophen (TYLENOL) tablet 325 mg  325 mg   •  acetaminophen (TYLENOL) tablet 650 mg  650 mg   • HYDROcodone-acetaminophen (NORCO) 5-325 MG per tablet 2 Tab  2 Tab   • oxytocin (PITOCIN) infusion (for postpartum)   mL/hr   • miSOPROStol (CYTOTEC) tablet 800 mcg  800 mcg   • ibuprofen (MOTRIN) tablet 800 mg  800 mg   • acetaminophen (TYLENOL) tablet 325 mg  325 mg   • acetaminophen (TYLENOL) tablet 650 mg  650 mg   • oxyCODONE-acetaminophen (PERCOCET) 5-325 MG per tablet 2 Tab  2 Tab   • docusate sodium (COLACE) capsule 100 mg  100 mg   • prenatal plus vitamin (STUARTNATAL 1+1) 27-1 MG tablet 1 Tab  1 Tab       Exam:  Breast Exam: negative  Chest: CTA  CVS:RRR  Abdomen: Abdomen soft, non-tender. BS normal. No masses,  No organomegaly  Fundus Non [id number]}  Incision: none  Perineum: perineum intact  Extremity: extremities, peripheral pulses and reflexes normal     Labs:  Recent Labs      08/31/18   1133   WBC  13.8*   RBC  3.96*   HEMOGLOBIN  11.5*   HEMATOCRIT  35.4*   MCV  89.4   MCH  29.0   MCHC  32.5*   RDW  45.9   PLATELETCT  198   MPV  11.2        Activity:   Discharge to home  Pelvic Rest x 6 weeks    Assessment:  normal postpartum course  Discharge Assessment: No heavy bleeding or foul vaginal discharge      Follow up: .TPC  in 5 weeks for vaginal. Plan of care discussed     Discharge Meds:   RX Ibuprofen       Xavier Dial M.D.

## 2018-09-01 NOTE — DISCHARGE INSTRUCTIONS
POSTPARTUM DISCHARGE INSTRUCTIONS FOR MOM    YOB: 1990   Age: 27 y.o.               Admit Date: 8/31/2018     Discharge Date: 9/1/2018  Attending Doctor:  Zeb Thacker M.D.                  Allergies:  Nkda [no known drug allergy]    Discharged to home by car. Discharged via wheelchair, hospital escort: Yes.  Special equipment needed: Not Applicable  Belongings with: Personal  Be sure to schedule a follow-up appointment with your primary care doctor or any specialists as instructed.     Discharge Plan:   Diet Plan: Discussed  Activity Level: Discussed  Confirmed Follow up Appointment: Patient to Call and Schedule Appointment  Confirmed Symptoms Management: Discussed  Medication Reconciliation Updated: Yes  Influenza Vaccine Indication: Indicated: Not available from distributor/    REASONS TO CALL YOUR OBSTETRICIAN:  1.   Persistent fever or shaking chills (Temperature higher than 100.4)  2.   Heavy bleeding (soaking more than 1 pad per hour); Passing clots  3.   Foul odor from vagina  4.   Mastitis (Breast infection; breast pain, chills, fever, redness)  5.   Urinary pain, burning or frequency  6.   Episiotomy infection  7.   Severe depression longer than 24 hours    HAND WASHING  · Prior to handling the baby.  · Before breastfeeding or bottle feeding baby.  · After using the bathroom or changing the baby's diaper.    VAGINAL CARE  · Nothing inside vagina for 6 weeks: no sexual intercourse, tampons or douching.  · Bleeding may continue for 2-4 weeks.  Amount may vary.    · Call your physician for heavy bleeding which means soaking more than 1 pad per hour    BIRTH CONTROL  · It is possible to become pregnant at any time after delivery and while breastfeeding.  · Plan to discuss a method of birth control with your physician at your follow up visit. visit.    DIET AND ELIMINATION  · Eating more fiber (bran cereal, fruits, and vegetables) and drinking plenty of fluids will help to avoid  "constipation.  · Urinary frequency after childbirth is normal.    POSTPARTUM BLUES  During the first few days after birth, you may experience a sense of the \"blues\" which may include impatience, irritability or even crying.  These feeling come and go quickly.  However, as many as 1 in 10 women experience emotional symptoms known as postpartum depression.  Postpartum depression:  May start as early as the second or third day after delivery or take several weeks or months to develop.  Symptoms of \"blues\" are present, but are more intense:  Crying spells; loss of appetite; feelings of hopelessness or loss of control; fear of touching the baby; over concern or no concern at all about the baby; little or no concern about your own appearance/caring for yourself; and/or inability to sleep or excessive sleeping.  Contact your physician if you are experiencing any of these symptoms.  Crisis Hotline:  · [place] Crisis Hotline:  5-948-DXXRSVA  Or 1-982.137.4914  · [place] Crisis Hotline:  1-918.429.4126  Or 763-871-3004    PREVENTING SHAKEN BABY:  If you are angry or stressed, PUT THE BABY IN THE CRIB, step away, take some deep breaths, and wait until you are calm to care for the baby.  DO NOT SHAKE THE BABY.  You are not alone, call a supporter for help.  · Crisis Call Center 24/7 crisis line 836-479-8684 or 1-643.861.3783  · You can also text them, text \"ANSWER\" to 309828    QUIT SMOKING/TOBACCO USE:  I understand the use of any tobacco products increases my chance of suffering from future heart disease and could cause other illnesses which may shorten my life. Quitting the use of tobacco products is the single most important thing I can do to improve my health. For further information on smoking / tobacco cessation call a Toll Free Quit Line at 1-184.718.9651 (*[employer] [place]) or 1-570.730.7628 (American Lung Association) or you can access the web based program at www.lungusa.org.  · [place] Tobacco Users Help " Line:  (456) 258-1399       Toll Free: 1-529.355.5909  · Quit Tobacco Program [facility] (573)209-5505    DEPRESSION / SUICIDE RISK:  As you are discharged from this [facility], it is important to learn how to keep safe from harming yourself.    Recognize the warning signs:  · Abrupt changes in personality, positive or negative- including increase in energy   · Giving away possessions  · Change in eating patterns- significant weight changes-  positive or negative  · Change in sleeping patterns- unable to sleep or sleeping all the time   · Unwillingness or inability to communicate  · Depression  · Unusual sadness, discouragement and loneliness  · Talk of wanting to die  · Neglect of personal appearance   · Rebelliousness- reckless behavior  · Withdrawal from people/activities they love  · Confusion- inability to concentrate     If you or a loved one observes any of these behaviors or has concerns about self-harm, here's what you can do:  · Talk about it- your feelings and reasons for harming yourself  · Remove any means that you might use to hurt yourself (examples: pills, rope, extension cords, firearm)  · Get professional help from the community (Mental Health, Substance Abuse, psychological counseling)  · Do not be alone:Call your Safe Contact- someone whom you trust who will be there for you.  · Call your local CRISIS HOTLINE 333-5246 or 682-540-3250  · Call your local Children's Mobile Crisis Response Team [place] (700) 058-4161 or www.Riffyn  · Call the toll free National Suicide Prevention Hotlines   · National Suicide Prevention Lifeline 067-971-JLZH (1974)  · National Hope Line Network 800-SUICIDE (776-4189)    DISCHARGE SURVEY:  Thank you for choosing [facility].  We hope we provided you with very good care.  You may be receiving a survey.  Your opinion is valuable to us.    ADDITIONAL EDUCATIONAL MATERIALS GIVEN TO PATIENT: none    My signature on this  form indicates that:  1.  I have reviewed and understand the above information  2.  My questions regarding this information have been answered to my satisfaction.  3.  I have formulated a plan with my discharge nurse to obtain my prescribed medication for home.

## 2018-09-01 NOTE — PROGRESS NOTES
Lucina Casas R.N. Lactation Consultant Signed   Progress Notes Date of Service: 9/1/2018 12:43 PM         []Hide copied text  []Hover for attribution information  Met with parents before discharge to check baby's latch. Mother using football hold and baby nose is lost in her breast. Showed her how to do cross-cradle hold to get more control and a wider latch. Mom states that this feels better to her. Baby's birth weight was  9#3oz and nurses eagerly. Encouraged mother to make sure latch is wide and comfortable since he is such a strong nurser. Offered more help as needed while here.

## 2018-09-01 NOTE — PROGRESS NOTES
Assumed care of patient, received report from day RN. Assessment complete. Pt did complain of some pain, please see MAR for medication given. No other needs at this time. Call light and personal belongings within reach.

## 2018-09-01 NOTE — CARE PLAN
Problem: Infection  Goal: Will remain free from infection  Outcome: PROGRESSING AS EXPECTED  No s/sx infection-vaginal lochia light, rubra with no odor.

## 2018-09-01 NOTE — PROGRESS NOTES
0710- Bedside report received from SCOTT [place].  Patient denied needs.  0915- Patient assessment done.  Patient stated that she is voiding without difficulty and passing flatus.  Patient denied dizziness and stated that she is walking without difficulty.  Discussed pain management plan and patient prefers to call for pain intervention as needed.  Reviewed plan of care.

## 2018-09-01 NOTE — CARE PLAN
Problem: Safety  Goal: Will remain free from injury  Outcome: PROGRESSING AS EXPECTED  Pt ambulating in room and halls independently with steady gait. Pt uses call light appropriately.

## 2018-10-12 ENCOUNTER — POST PARTUM (OUTPATIENT)
Dept: OBGYN | Facility: CLINIC | Age: 28
End: 2018-10-12
Payer: MEDICAID

## 2018-10-12 VITALS — SYSTOLIC BLOOD PRESSURE: 112 MMHG | DIASTOLIC BLOOD PRESSURE: 70 MMHG | BODY MASS INDEX: 24.55 KG/M2 | WEIGHT: 143 LBS

## 2018-10-12 PROBLEM — O35.HXX0 SHORT FEMUR OF FETUS ON PRENATAL ULTRASOUND: Status: RESOLVED | Noted: 2018-04-17 | Resolved: 2018-10-12

## 2018-10-12 PROBLEM — Z34.93 ENCOUNTER FOR SUPERVISION OF NORMAL PREGNANCY IN THIRD TRIMESTER: Status: RESOLVED | Noted: 2018-02-14 | Resolved: 2018-10-12

## 2018-10-12 PROCEDURE — 90050 PR POSTPARTUM VISIT: CPT | Performed by: NURSE PRACTITIONER

## 2018-10-12 ASSESSMENT — ENCOUNTER SYMPTOMS
CONSTITUTIONAL NEGATIVE: 1
PSYCHIATRIC NEGATIVE: 1
NEUROLOGICAL NEGATIVE: 1
GASTROINTESTINAL NEGATIVE: 1
RESPIRATORY NEGATIVE: 1
EYES NEGATIVE: 1
CARDIOVASCULAR NEGATIVE: 1
MUSCULOSKELETAL NEGATIVE: 1

## 2018-10-12 NOTE — PROGRESS NOTES
Pt here today for postpartum exam.  Delivery Date: 08/31/2018  Currently breast feeding only  BCM: condoms, [facility] and [facility].   LMP: not yet  WT: 143 lb  BP: 112/70  Pt states no complaints today  Good ph: [phone]

## 2018-10-12 NOTE — PROGRESS NOTES
Subjective:      Melissa Kirkland is a 27 y.o.  female who presents for her postpartum exam. She had a  without complication. Her prenatal course was uncomplicated. She denies dysuria, vaginal bleeding, odor, itching or breast problems. She is breastfeeding. She desires only condoms for her birth control method. Reports no sex prior to this appointment.  Denies any S/S of PP depression.    Assessment   Assessment:    1. PP care of lactating women   2. Exam WNL   3. Pap WNL on 2018  4. Desires no contraception     Patient Active Problem List    Diagnosis Date Noted   • Encounter for postpartum care of lactating mother 10/12/2018       Plan   Plan:    1. Breastfeeding support   2. Continue PNV   3. Contraceptive counseling - follow up w health dept or [facility] for [facility]CM  4. Encouraged condom use   5. Discussed diet, exercise and resumption of sexual activity   6. Gave copy of pap   7.  F/u c PCP or [facility] clinic as needed for primary care needs.         HPI    Review of Systems   Constitutional: Negative.    HENT: Negative.    Eyes: Negative.    Respiratory: Negative.    Cardiovascular: Negative.    Gastrointestinal: Negative.    Genitourinary: Negative.    Musculoskeletal: Negative.    Skin: Negative.    Neurological: Negative.    Endo/Heme/Allergies: Negative.    Psychiatric/Behavioral: Negative.           Objective:     /70   Wt 64.9 kg (143 lb)   LMP 2017 (Approximate)   BMI 24.55 kg/m²      Physical Exam   Constitutional: She is oriented to person, place, and time. She appears well-developed and well-nourished.   Pulmonary/Chest: Effort normal.   Abdominal: Soft.   Genitourinary: Vagina normal and uterus normal. Rectal exam shows no tenderness. Pelvic exam was performed with patient supine. No labial fusion. There is no rash, tenderness, lesion or injury on the right labia. There is no rash, tenderness, lesion or injury on the left labia. No erythema, tenderness or  bleeding in the vagina. No signs of injury around the vagina. No vaginal discharge found.   Neurological: She is alert and oriented to person, place, and time.   Skin: Skin is warm and dry.   Psychiatric: She has a normal mood and affect. Her behavior is normal. Judgment and thought content normal.               Assessment/Plan:     1. Encounter for postpartum care of lactating mother

## 2019-07-29 ENCOUNTER — OFFICE VISIT (OUTPATIENT)
Dept: URGENT CARE | Facility: PHYSICIAN GROUP | Age: 29
End: 2019-07-29
Payer: COMMERCIAL

## 2019-07-29 VITALS
TEMPERATURE: 98.8 F | BODY MASS INDEX: 24.92 KG/M2 | HEART RATE: 87 BPM | SYSTOLIC BLOOD PRESSURE: 118 MMHG | DIASTOLIC BLOOD PRESSURE: 70 MMHG | OXYGEN SATURATION: 94 % | WEIGHT: 146 LBS | HEIGHT: 64 IN

## 2019-07-29 DIAGNOSIS — N76.0 VULVOVAGINITIS: ICD-10-CM

## 2019-07-29 PROCEDURE — 99203 OFFICE O/P NEW LOW 30 MIN: CPT | Performed by: PHYSICIAN ASSISTANT

## 2019-07-29 RX ORDER — FLUCONAZOLE 150 MG/1
TABLET ORAL
Qty: 2 TAB | Refills: 0 | Status: SHIPPED | OUTPATIENT
Start: 2019-07-29 | End: 2023-06-04

## 2021-11-30 NOTE — PROGRESS NOTES
Chief Complaint   Patient presents with   • Vaginal Pain     Burning with intercourse, pain while walking x 3 days.         HISTORY OF PRESENT ILLNESS: Patient is a 28 y.o. female who presents today for about 3 days of vaginal discomfort.  She notes that she is having discomfort constantly throughout the vaginal and vulva area.  She notes there is discomfort in particular with just walking and with intercourse as well. She has noticed some mild thicker white discharge.   She notes LMP 3 weeks ago. Uses condoms with intercourse always.  No concern for STD.   No vaginal bleeding.  She tried to put on some Monistat this morning but it did burn a bit.  No recent antibiotics.     Patient Active Problem List    Diagnosis Date Noted   • Encounter for postpartum care of lactating mother 10/12/2018       Allergies:Nkda [no known drug allergy]    Current Outpatient Prescriptions Ordered in [facility]   Medication Sig Dispense Refill   • fluconazole (DIFLUCAN) 150 MG tablet Take 1 tablet once.  Repeat in 72 hours if needed 2 Tab 0   • Prenatal Multivit-Min-Fe-FA (PRENATAL VITAMINS PO) Take  by mouth.       No current Epic-ordered facility-administered medications on file.        No past medical history on file.    Social History   Substance Use Topics   • Smoking status: Never Smoker   • Smokeless tobacco: Never Used   • Alcohol use No       Family Status   Relation Status   • Mo Alive   • Fa Alive   • Sis Alive   • Bro Alive   • MGMo    • MGFa    • PGMo Alive   • PGFa Alive     Family History   Problem Relation Age of Onset   • Hypertension Mother    • Hyperlipidemia Father    • No Known Problems Sister    • No Known Problems Brother        ROS:  Review of Systems   Constitutional: Negative for fever, chills, weight loss and malaise/fatigue.   HENT: Negative for ear pain, nosebleeds, congestion, sore throat and neck pain.    Eyes: Negative for blurred vision.   Respiratory: Negative for cough, sputum production,  "shortness of breath and wheezing.    Cardiovascular: Negative for chest pain, palpitations, orthopnea and leg swelling.   Gastrointestinal: Negative for heartburn, nausea, vomiting and abdominal pain.   Genitourinary: SEE HPI    Exam:  /70 (BP Location: Right arm, Patient Position: Sitting, BP Cuff Size: Adult)   Pulse 87   Temp 37.1 °C (98.8 °F) (Temporal)   Ht 1.626 m (5' 4\")   Wt 66.2 kg (146 lb)   SpO2 94%   General:  Well nourished, well developed female in NAD  Eyes: PERRLA, EOM within normal limits, no conjunctival injection, no scleral icterus, visual fields and acuity grossly intact.  Mouth: reasonable hygiene, no erythema exudates or tonsillar enlargement.  Neck: no masses, range of motion within normal limits, no tracheal deviation. No lymphadenopathy  Pulmonary: Normal respiratory effort, no wheezes, crackles, or rhonchi.  Cardiovascular: regular rate and rhythm without murmurs, rubs, or gallops.  Abdomen: Soft, nontender, nondistended. Normal bowel sounds. No hepatosplenomegaly or masses, or hernias. No rebound or guarding.  : Defers  exam.   Skin: No visible rashes or lesion. Warm, pink, dry.   Extremities: no clubbing, cyanosis, or edema.  Neuro: A&O x 3. Speech normal/clear.  Normal gait.         Assessment/Plan:  1. Vulvovaginitis  fluconazole (DIFLUCAN) 150 MG tablet       -defers  exam at this time.   -would like to empirically treat at this time.   -trial of Diflucan as above.   -RTC precautions, PCP follow up      Supportive care, differential diagnoses, and indications for immediate follow-up discussed with patient.   Pathogenesis of diagnosis discussed including typical length and natural progression.   Instructed to return to clinic or nearest emergency department for any change in condition, further concerns, or worsening of symptoms.  Patient states understanding of the plan of care and discharge instructions.  .      Norma Posada P.A.-C.    " MAR

## 2022-09-30 ENCOUNTER — OFFICE VISIT (OUTPATIENT)
Dept: URGENT CARE | Facility: PHYSICIAN GROUP | Age: 32
End: 2022-09-30
Payer: COMMERCIAL

## 2022-09-30 VITALS
HEIGHT: 63 IN | OXYGEN SATURATION: 99 % | DIASTOLIC BLOOD PRESSURE: 64 MMHG | HEART RATE: 64 BPM | SYSTOLIC BLOOD PRESSURE: 112 MMHG | BODY MASS INDEX: 26.58 KG/M2 | RESPIRATION RATE: 12 BRPM | WEIGHT: 150 LBS | TEMPERATURE: 99.1 F

## 2022-09-30 DIAGNOSIS — J02.9 PHARYNGITIS, UNSPECIFIED ETIOLOGY: ICD-10-CM

## 2022-09-30 DIAGNOSIS — Z20.818 STREP THROAT EXPOSURE: ICD-10-CM

## 2022-09-30 LAB
INT CON NEG: NORMAL
INT CON POS: NORMAL
S PYO AG THROAT QL: NEGATIVE

## 2022-09-30 PROCEDURE — 99213 OFFICE O/P EST LOW 20 MIN: CPT

## 2022-09-30 PROCEDURE — 87880 STREP A ASSAY W/OPTIC: CPT

## 2022-09-30 RX ORDER — PENICILLIN V POTASSIUM 500 MG/1
500 TABLET ORAL 3 TIMES DAILY
Qty: 30 TABLET | Refills: 0 | Status: SHIPPED | OUTPATIENT
Start: 2022-09-30 | End: 2022-10-10

## 2022-09-30 ASSESSMENT — ENCOUNTER SYMPTOMS
SHORTNESS OF BREATH: 0
WEIGHT LOSS: 0
FEVER: 0
WHEEZING: 0
HEMOPTYSIS: 0
HEADACHES: 1
COUGH: 0
SORE THROAT: 1
CHILLS: 0
MYALGIAS: 0
SPUTUM PRODUCTION: 0
DIAPHORESIS: 0

## 2022-10-01 NOTE — PROGRESS NOTES
Subjective:   Melissa Kirkland is a 31 y.o. female who presents for Pharyngitis (Started today , exposure to strep, white spots in back of throat )      HPI: This is a 31-year-old female who presents today for sore throat.  This is a new problem.  Patient reports developing sore throat that started this morning.  Pain is reported 4\10.  She has not taken anything for her symptoms.  She does also report recent strep exposure from son.  She denies personal history of strep throat.  She denies fevers, chills, body aches.  No other complaints to report today.    Review of Systems   Constitutional:  Negative for chills, diaphoresis, fever, malaise/fatigue and weight loss.   HENT:  Positive for sore throat.    Respiratory:  Negative for cough, hemoptysis, sputum production, shortness of breath and wheezing.    Musculoskeletal:  Negative for myalgias.   Neurological:  Positive for headaches.   All other systems reviewed and are negative.    Medications:    Current Outpatient Medications on File Prior to Visit   Medication Sig Dispense Refill    fluconazole (DIFLUCAN) 150 MG tablet Take 1 tablet once.  Repeat in 72 hours if needed 2 Tab 0    Prenatal Multivit-Min-Fe-FA (PRENATAL VITAMINS PO) Take  by mouth.       No current facility-administered medications on file prior to visit.        Allergies:   Nkda [no known drug allergy]    Problem List:   Patient Active Problem List   Diagnosis    Encounter for postpartum care of lactating mother        Surgical History:  Past Surgical History:   Procedure Laterality Date    MAMMOPLASTY AUGMENTATION Bilateral 4/25/2016    Procedure: MAMMOPLASTY AUGMENTATION WITH SILICONE IMPLANTS;  Surgeon: Ant Bunn M.D.;  Location: SURGERY [facility];  Service:     OTHER  2015    breast implants        Past Social Hx:   Social History     Tobacco Use    Smoking status: Never    Smokeless tobacco: Never   Substance Use Topics    Alcohol use: No    Drug use: No     "      Problem list, medications, and allergies reviewed by myself today in Epic.     Objective:     /64 (BP Location: Right arm, Patient Position: Sitting, BP Cuff Size: Adult)   Pulse 64   Temp 37.3 °C (99.1 °F) (Temporal)   Resp 12   Ht 1.6 m (5' 3\")   Wt 68 kg (150 lb)   SpO2 99%   BMI 26.57 kg/m²     Physical Exam  Vitals and nursing note reviewed.   Constitutional:       General: She is not in acute distress.     Appearance: Normal appearance. She is normal weight. She is not ill-appearing, toxic-appearing or diaphoretic.   HENT:      Head: Normocephalic and atraumatic.      Right Ear: Tympanic membrane, ear canal and external ear normal. There is no impacted cerumen.      Left Ear: Tympanic membrane, ear canal and external ear normal. There is no impacted cerumen.      Mouth/Throat:      Mouth: Mucous membranes are moist.      Pharynx: Oropharynx is clear. Posterior oropharyngeal erythema present.   Cardiovascular:      Rate and Rhythm: Normal rate and regular rhythm.      Pulses: Normal pulses.      Heart sounds: Normal heart sounds. No murmur heard.    No friction rub. No gallop.   Pulmonary:      Effort: Pulmonary effort is normal. No respiratory distress.      Breath sounds: Normal breath sounds. No stridor. No wheezing, rhonchi or rales.   Chest:      Chest wall: No tenderness.   Musculoskeletal:      Cervical back: Neck supple. No tenderness.   Lymphadenopathy:      Cervical: No cervical adenopathy.   Skin:     General: Skin is warm and dry.      Capillary Refill: Capillary refill takes less than 2 seconds.   Neurological:      General: No focal deficit present.      Mental Status: She is alert and oriented to person, place, and time. Mental status is at baseline.      Cranial Nerves: No cranial nerve deficit.      Motor: No weakness.      Gait: Gait normal.   Psychiatric:         Mood and Affect: Mood normal.         Behavior: Behavior normal.         Thought Content: Thought content " normal.         Judgment: Judgment normal.       Assessment/Plan:     Diagnosis and associated orders:   1. Pharyngitis, unspecified etiology  POCT Rapid Strep A    penicillin v potassium (VEETID) 500 MG Tab      2. Strep throat exposure  POCT Rapid Strep A    penicillin v potassium (VEETID) 500 MG Tab             Comments/MDM:   Pt is clinically stable at today's acute urgent care visit.  No acute distress noted. Appropriate for outpatient management at this time.     Acute problem.  Rapid strep negative in clinic today.  Given patient's recent strep throat exposure from son and pharyngitis, she will be treated empirically with penicillin  mg 3 times daily x10 days.  I have advised patient to begin taking antibiotic therapy as prescribed, alternate Tylenol and ibuprofen as needed for pain, warm salt gargles, monitor symptoms.  She is to return for any new or worsening signs or symptoms.  Patient agreeable verbalizes good understanding today.           Discussed DDx, management options (risks,benefits, and alternatives to planned treatment), natural progression and supportive care.  Expressed understanding and the treatment plan was agreed upon. Questions were encouraged and answered   Return to urgent care prn if new or worsening sx or if there is no improvement in condition prn.    Educated in Red flags and indications to immediately call 911 or present to the Emergency Department.   Advised the patient to follow-up with the primary care physician for recheck, reevaluation, and consideration of further management.    I personally reviewed prior external notes and test results pertinent to today's visit.  I have independently reviewed and interpreted all diagnostics ordered during this urgent care acute visit.         Please note that this dictation was created using voice recognition software. I have made a reasonable attempt to correct obvious errors, but I expect that there are errors of grammar and  possibly content that I did not discover before finalizing the note.    This note was electronically signed by LORENA Berman

## 2023-02-23 ENCOUNTER — OFFICE VISIT (OUTPATIENT)
Dept: URGENT CARE | Facility: PHYSICIAN GROUP | Age: 33
End: 2023-02-23
Payer: COMMERCIAL

## 2023-02-23 VITALS
BODY MASS INDEX: 26.58 KG/M2 | OXYGEN SATURATION: 96 % | DIASTOLIC BLOOD PRESSURE: 70 MMHG | TEMPERATURE: 97.8 F | HEIGHT: 63 IN | SYSTOLIC BLOOD PRESSURE: 112 MMHG | RESPIRATION RATE: 14 BRPM | WEIGHT: 150 LBS | HEART RATE: 81 BPM

## 2023-02-23 DIAGNOSIS — J02.9 SORE THROAT: ICD-10-CM

## 2023-02-23 DIAGNOSIS — J02.0 ACUTE STREPTOCOCCAL PHARYNGITIS: ICD-10-CM

## 2023-02-23 DIAGNOSIS — Z20.818 EXPOSURE TO STREP THROAT: ICD-10-CM

## 2023-02-23 LAB
INT CON NEG: NEGATIVE
INT CON POS: POSITIVE
S PYO AG THROAT QL: NORMAL

## 2023-02-23 PROCEDURE — 87880 STREP A ASSAY W/OPTIC: CPT | Performed by: NURSE PRACTITIONER

## 2023-02-23 PROCEDURE — 99213 OFFICE O/P EST LOW 20 MIN: CPT | Performed by: NURSE PRACTITIONER

## 2023-02-23 RX ORDER — AMOXICILLIN 500 MG/1
500 CAPSULE ORAL 2 TIMES DAILY
Qty: 20 CAPSULE | Refills: 0 | Status: SHIPPED | OUTPATIENT
Start: 2023-02-23 | End: 2023-03-05

## 2023-02-23 ASSESSMENT — ENCOUNTER SYMPTOMS
COUGH: 0
MYALGIAS: 0
HEADACHES: 0
WEAKNESS: 0
CONSTIPATION: 0
EYE REDNESS: 0
SHORTNESS OF BREATH: 0
FEVER: 0
DIARRHEA: 0
WHEEZING: 0
ABDOMINAL PAIN: 0
NECK PAIN: 0
CHILLS: 0
EYE DISCHARGE: 0
VOMITING: 0
NAUSEA: 0
SORE THROAT: 1
DIZZINESS: 0

## 2023-02-24 NOTE — PROGRESS NOTES
Subjective     Melissa Kirkland is a 32 y.o. female who presents with Pharyngitis (Monday )            Pharyngitis   Pertinent negatives include no abdominal pain, congestion, coughing, diarrhea, ear pain, headaches, neck pain, shortness of breath or vomiting. States has been experiencing sore throat x4 days.  Exposure to strep from son who tested positive yesterday.  No other uri symptoms experienced.     PMH:  has no past medical history of Addisons disease (HCC), Adrenal disorder (HCC), Allergy, Anemia, Anxiety, Blood transfusion, Blood transfusion without reported diagnosis, Clotting disorder ([facility]), COPD, Cushings syndrome ([facility]), Diabetic neuropathy ([facility]), GERD (gastroesophageal reflux disease), Hyperlipidemia, IBD (inflammatory bowel disease), Meningitis, Migraine, Muscle disorder, OSTEOPOROSIS, Parathyroid disorder ([facility]), Pituitary disease (HCC), Sickle cell disease (HCC), Substance abuse ([facility]), or Ulcer.  MEDS:   Current Outpatient Medications:     fluconazole (DIFLUCAN) 150 MG tablet, Take 1 tablet once.  Repeat in 72 hours if needed (Patient not taking: Reported on 2/23/2023), Disp: 2 Tab, Rfl: 0    Prenatal Multivit-Min-Fe-FA (PRENATAL VITAMINS PO), Take  by mouth. (Patient not taking: Reported on 2/23/2023), Disp: , Rfl:   ALLERGIES:   Allergies   Allergen Reactions    Nkda [No Known Drug Allergy]      SURGHX:   Past Surgical History:   Procedure Laterality Date    MAMMOPLASTY AUGMENTATION Bilateral 4/25/2016    Procedure: MAMMOPLASTY AUGMENTATION WITH SILICONE IMPLANTS;  Surgeon: Ant Bunn M.D.;  Location: SURGERY [facility];  Service:     OTHER  2015    breast implants      SOCHX:  reports that she has never smoked. She has never used smokeless tobacco. She reports that she does not drink alcohol and does not use drugs.  FH: Family history was reviewed, no pertinent findings to report      Review of Systems   Constitutional:  Negative for chills, fever and malaise/fatigue.  "  HENT:  Positive for sore throat. Negative for congestion and ear pain.    Eyes:  Negative for discharge and redness.   Respiratory:  Negative for cough, shortness of breath and wheezing.    Gastrointestinal:  Negative for abdominal pain, constipation, diarrhea, nausea and vomiting.   Musculoskeletal:  Negative for myalgias and neck pain.   Skin:  Negative for itching and rash.   Neurological:  Negative for dizziness, weakness and headaches.   Endo/Heme/Allergies:  Negative for environmental allergies.   All other systems reviewed and are negative.           Objective     /70 (BP Location: Right arm, Patient Position: Sitting, BP Cuff Size: Adult)   Pulse 81   Temp 36.6 °C (97.8 °F) (Temporal)   Resp 14   Ht 1.6 m (5' 3\")   Wt 68 kg (150 lb)   LMP 01/23/2023   SpO2 96%   BMI 26.57 kg/m²      Physical Exam  Vitals reviewed.   Constitutional:       General: She is awake. She is not in acute distress.     Appearance: Normal appearance. She is well-developed. She is not ill-appearing, toxic-appearing or diaphoretic.   HENT:      Head: Normocephalic.      Nose: Nose normal.      Mouth/Throat:      Lips: Pink.      Mouth: Mucous membranes are dry.      Pharynx: Uvula midline. Posterior oropharyngeal erythema present. No pharyngeal swelling, oropharyngeal exudate or uvula swelling.      Tonsils: No tonsillar exudate or tonsillar abscesses. 1+ on the right. 1+ on the left.   Eyes:      Conjunctiva/sclera: Conjunctivae normal.   Cardiovascular:      Rate and Rhythm: Normal rate.   Pulmonary:      Effort: Pulmonary effort is normal.   Musculoskeletal:         General: Normal range of motion.      Cervical back: Normal range of motion and neck supple.   Skin:     General: Skin is warm and dry.   Neurological:      Mental Status: She is alert and oriented to person, place, and time.   Psychiatric:         Attention and Perception: Attention normal.         Mood and Affect: Mood normal.         Speech: Speech " normal.         Behavior: Behavior normal. Behavior is cooperative.                           Assessment & Plan        1. Sore throat    - POCT Rapid Strep A    2. Exposure to strep throat    - POCT Rapid Strep A    3. Acute streptococcal pharyngitis    - amoxicillin (AMOXIL) 500 MG Cap; Take 1 Capsule by mouth 2 times a day for 10 days.  Dispense: 20 Capsule; Refill: 0    -Maintain hydration/water intake  -May use over the counter Ibuprofen/Tylenol as needed for any fever, body aches or ear/throat pain  -May take long acting antihistamine (avoid decongestant forms) for any nasal congestion/runny nose/post nasal drip symptoms as needed  -May use over the counter saline nasal spray for nasal congestion/post nasal drip as needed  -May use over the counter Nasacort/Flonase for nasal decongestion as needed   -May use throat lozenges for throat discomfort as needed   -Change toothbrush after 24 hrs of antibiotics, if prescribed  -May gargle with salt water up to 4x/day as needed for throat discomfort (1 tsp salt dissolved in 1 cup warm water)  -May drink smoothies/soft foods or warm liquids if too painful to swallow solid foods  -Monitor for any increased throat pain, difficulty swallowing/breathing or speaking, fever, ear pain- must be re-evaluated in urgent care or emergency room

## 2023-06-04 ENCOUNTER — OFFICE VISIT (OUTPATIENT)
Dept: URGENT CARE | Facility: PHYSICIAN GROUP | Age: 33
End: 2023-06-04
Payer: COMMERCIAL

## 2023-06-04 VITALS
HEART RATE: 80 BPM | HEIGHT: 63 IN | RESPIRATION RATE: 16 BRPM | SYSTOLIC BLOOD PRESSURE: 100 MMHG | OXYGEN SATURATION: 94 % | BODY MASS INDEX: 26.26 KG/M2 | WEIGHT: 148.2 LBS | TEMPERATURE: 98.9 F | DIASTOLIC BLOOD PRESSURE: 60 MMHG

## 2023-06-04 DIAGNOSIS — J02.9 SORE THROAT: ICD-10-CM

## 2023-06-04 DIAGNOSIS — J06.9 VIRAL URI: Primary | ICD-10-CM

## 2023-06-04 LAB
INT CON NEG: NORMAL
INT CON POS: NORMAL
S PYO AG THROAT QL: NEGATIVE

## 2023-06-04 PROCEDURE — 3078F DIAST BP <80 MM HG: CPT | Performed by: PHYSICIAN ASSISTANT

## 2023-06-04 PROCEDURE — 3074F SYST BP LT 130 MM HG: CPT | Performed by: PHYSICIAN ASSISTANT

## 2023-06-04 PROCEDURE — 99213 OFFICE O/P EST LOW 20 MIN: CPT | Performed by: PHYSICIAN ASSISTANT

## 2023-06-04 PROCEDURE — 87880 STREP A ASSAY W/OPTIC: CPT | Performed by: PHYSICIAN ASSISTANT

## 2023-06-04 ASSESSMENT — ENCOUNTER SYMPTOMS
FEVER: 0
NAUSEA: 0
VOMITING: 0
MYALGIAS: 0
COUGH: 1
DIZZINESS: 0
ABDOMINAL PAIN: 0
HEADACHES: 1
CHILLS: 0
SHORTNESS OF BREATH: 0
DIARRHEA: 0
SORE THROAT: 1
SINUS PAIN: 0

## 2023-06-04 NOTE — PROGRESS NOTES
Subjective     Melissa Kirkland is a 32 y.o. female who presents with Sore Throat (Headache, painful swallowing, cough, x5 days )    HPI:  Melissa Kirkland is a 32 y.o. female who presents today evaluation of sore throat and URI symptoms.  Patient is here with her  and son who have similar symptoms.  Patient reports that her symptoms are 4 to 5 days ago.  She has had cough, congestion, headache.  She also notes that yesterday she started to have painful throat with swallowing.  She has tried taking ibuprofen which has not helped very much.  She denies any fever/chills or body aches.          Review of Systems   Constitutional:  Positive for malaise/fatigue. Negative for chills and fever.   HENT:  Positive for congestion and sore throat. Negative for sinus pain.    Respiratory:  Positive for cough. Negative for shortness of breath.    Cardiovascular:  Negative for chest pain.   Gastrointestinal:  Negative for abdominal pain, diarrhea, nausea and vomiting.   Musculoskeletal:  Negative for myalgias.   Neurological:  Positive for headaches. Negative for dizziness.           PMH:  has no past medical history of Addisons disease ([facility]), Adrenal disorder ([facility]), Allergy, Anemia, Anxiety, Blood transfusion, Blood transfusion without reported diagnosis, Clotting disorder ([facility]), COPD, Cushings syndrome ([facility]), Diabetic neuropathy ([facility]), GERD (gastroesophageal reflux disease), Hyperlipidemia, IBD (inflammatory bowel disease), Meningitis, Migraine, Muscle disorder, OSTEOPOROSIS, Parathyroid disorder ([facility]), Pituitary disease ([facility]), Sickle cell disease ([facility]), Substance abuse ([facility]), or Ulcer.  MEDS:   Current Outpatient Medications:     fluconazole (DIFLUCAN) 150 MG tablet, Take 1 tablet once.  Repeat in 72 hours if needed, Disp: 2 Tab, Rfl: 0  ALLERGIES: No Known Allergies  SURGHX:   Past Surgical History:   Procedure Laterality Date    MAMMOPLASTY AUGMENTATION Bilateral 4/25/2016    Procedure:  "MAMMOPLASTY AUGMENTATION WITH SILICONE IMPLANTS;  Surgeon: Ant Bunn M.D.;  Location: SURGERY [facility];  Service:     OTHER  2015    breast implants      SOCHX:  reports that she has never smoked. She has never used smokeless tobacco. She reports that she does not drink alcohol and does not use drugs.  FH: Family history was reviewed, no pertinent findings to report        Objective     /60 (BP Location: Right arm, Patient Position: Sitting, BP Cuff Size: Adult)   Pulse 80   Temp 37.2 °C (98.9 °F) (Temporal)   Resp 16   Ht 1.6 m (5' 3\")   Wt 67.2 kg (148 lb 3.2 oz)   SpO2 94%   BMI 26.25 kg/m²      Physical Exam  Constitutional:       Appearance: She is well-developed.   HENT:      Head: Normocephalic and atraumatic.      Right Ear: Tympanic membrane, ear canal and external ear normal.      Left Ear: Tympanic membrane, ear canal and external ear normal.      Nose: Mucosal edema and congestion present. No rhinorrhea.      Mouth/Throat:      Lips: Pink.      Mouth: Mucous membranes are moist.      Pharynx: Oropharynx is clear. Uvula midline. No uvula swelling.   Eyes:      Conjunctiva/sclera: Conjunctivae normal.      Pupils: Pupils are equal, round, and reactive to light.   Cardiovascular:      Rate and Rhythm: Normal rate and regular rhythm.      Heart sounds: Normal heart sounds. No murmur heard.  Pulmonary:      Effort: Pulmonary effort is normal.      Breath sounds: Normal breath sounds. No decreased breath sounds, wheezing, rhonchi or rales.   Musculoskeletal:      Cervical back: Normal range of motion.   Lymphadenopathy:      Cervical: No cervical adenopathy.   Skin:     General: Skin is warm and dry.      Capillary Refill: Capillary refill takes less than 2 seconds.   Neurological:      Mental Status: She is alert and oriented to person, place, and time.   Psychiatric:         Behavior: Behavior normal.         Judgment: Judgment normal.         POCT Rapid Strep A - " Negative    Assessment & Plan       1. Sore throat  - POCT Rapid Strep A  -Supportive care discussed to include salt water gargles, throat lozenges, and increased fluid intake    2. Viral URI  - OTC cold/flu medications  -Supportive care also discussed to include the use of saline nasal rinses, steam inhalation, and the use of a cool-mist humidifier in the bedroom at night.  - PO fluids  - Rest  - Tylenol or ibuprofen as needed for fever > 100.4 F      Rapid strep negative.  Discussed with patient and her family that symptoms seem much more consistent with viral etiology.  No evidence of bacterial infection noted on today's exam.  Discussed that antibiotics will not treat a viral illness.  Supportive care as above.            Differential Diagnosis, natural history, and supportive care discussed. Return to the Urgent Care or follow up with your PCP if symptoms fail to resolve, or for any new or worsening symptoms. Emergency room precautions discussed. Patient and/or family appears understanding of information.

## 2023-08-09 ENCOUNTER — OFFICE VISIT (OUTPATIENT)
Dept: URGENT CARE | Facility: PHYSICIAN GROUP | Age: 33
End: 2023-08-09
Payer: COMMERCIAL

## 2023-08-09 VITALS
DIASTOLIC BLOOD PRESSURE: 62 MMHG | SYSTOLIC BLOOD PRESSURE: 108 MMHG | RESPIRATION RATE: 16 BRPM | OXYGEN SATURATION: 97 % | HEART RATE: 74 BPM | HEIGHT: 63 IN | WEIGHT: 148 LBS | BODY MASS INDEX: 26.22 KG/M2 | TEMPERATURE: 98 F

## 2023-08-09 DIAGNOSIS — J02.9 SORE THROAT: ICD-10-CM

## 2023-08-09 DIAGNOSIS — J06.9 VIRAL URI: ICD-10-CM

## 2023-08-09 LAB — S PYO DNA SPEC NAA+PROBE: NOT DETECTED

## 2023-08-09 PROCEDURE — 3074F SYST BP LT 130 MM HG: CPT | Performed by: NURSE PRACTITIONER

## 2023-08-09 PROCEDURE — 99213 OFFICE O/P EST LOW 20 MIN: CPT | Performed by: NURSE PRACTITIONER

## 2023-08-09 PROCEDURE — 87651 STREP A DNA AMP PROBE: CPT | Performed by: NURSE PRACTITIONER

## 2023-08-09 PROCEDURE — 3078F DIAST BP <80 MM HG: CPT | Performed by: NURSE PRACTITIONER

## 2023-08-09 RX ORDER — IBUPROFEN 800 MG/1
800 TABLET ORAL EVERY 8 HOURS PRN
Qty: 30 TABLET | Refills: 0 | Status: SHIPPED | OUTPATIENT
Start: 2023-08-09

## 2023-08-09 ASSESSMENT — ENCOUNTER SYMPTOMS
RESPIRATORY NEGATIVE: 1
CONSTITUTIONAL NEGATIVE: 1
TROUBLE SWALLOWING: 1
HEADACHES: 0
COUGH: 0
FEVER: 0
CHILLS: 0
SORE THROAT: 1
SHORTNESS OF BREATH: 0

## 2023-08-09 ASSESSMENT — VISUAL ACUITY: OU: 1

## 2023-08-10 NOTE — PROGRESS NOTES
Subjective:     Melissa Kirkland is a 32 y.o. female who presents for Sore Throat (X2 days )       Pharyngitis   This is a new problem. The current episode started yesterday. The problem has been gradually worsening. Associated symptoms include trouble swallowing (Painful). Pertinent negatives include no coughing, headaches or shortness of breath. Treatments tried: OTC analgesic. The treatment provided no relief.     Review of Systems   Constitutional: Negative.  Negative for chills, fever and malaise/fatigue.   HENT:  Positive for sore throat and trouble swallowing (Painful).    Respiratory: Negative.  Negative for cough and shortness of breath.    Neurological:  Negative for headaches.   All other systems reviewed and are negative.    Refer to [facility] for additional details.    During this visit, appropriate PPE was worn, and hand hygiene was performed.    PMH:  has no past medical history of Addisons disease ([facility]), Adrenal disorder ([facility]), Allergy, Anemia, Anxiety, Blood transfusion, Blood transfusion without reported diagnosis, Clotting disorder ([facility]), COPD, Cushings syndrome ([facility]), Diabetic neuropathy ([facility]), GERD (gastroesophageal reflux disease), Hyperlipidemia, IBD (inflammatory bowel disease), Meningitis, Migraine, Muscle disorder, OSTEOPOROSIS, Parathyroid disorder (HCC), Pituitary disease ([facility]), Sickle cell disease ([facility]), Substance abuse ([facility]), or Ulcer.    MEDS:   Current Outpatient Medications:     ibuprofen (MOTRIN) 800 MG Tab, Take 1 Tablet by mouth every 8 hours as needed for Moderate Pain or Inflammation., Disp: 30 Tablet, Rfl: 0    Benzocaine-Menthol 15-2.3 MG Lozenge, Dissolve 1 Lozenge in the mouth every 2 hours as needed (Sore throat)., Disp: 16 Lozenge, Rfl: 0    ALLERGIES: No Known Allergies  SURGHX:   Past Surgical History:   Procedure Laterality Date    MAMMOPLASTY AUGMENTATION Bilateral 4/25/2016    Procedure: MAMMOPLASTY AUGMENTATION WITH SILICONE IMPLANTS;  Surgeon: Ant LARA  "WILLY Bunn;  Location: SURGERY [facility];  Service:     OTHER  2015    breast implants      SOCHX:  reports that she has never smoked. She has never used smokeless tobacco. She reports that she does not drink alcohol and does not use drugs.    FH: Per HPI as applicable/pertinent.      Objective:     /62 (BP Location: Right arm, Patient Position: Sitting, BP Cuff Size: Adult)   Pulse 74   Temp 36.7 °C (98 °F) (Temporal)   Resp 16   Ht 1.6 m (5' 3\")   Wt 67.1 kg (148 lb)   SpO2 97%   BMI 26.22 kg/m²     Physical Exam  Nursing note reviewed.   Constitutional:       General: She is not in acute distress.     Appearance: She is well-developed. She is not ill-appearing or toxic-appearing.   HENT:      Mouth/Throat:      Mouth: Mucous membranes are moist.      Pharynx: Uvula midline. Pharyngeal swelling and posterior oropharyngeal erythema present. No oropharyngeal exudate.   Eyes:      General: Vision grossly intact.   Neck:      Trachea: Phonation normal.   Cardiovascular:      Rate and Rhythm: Normal rate.   Pulmonary:      Effort: Pulmonary effort is normal. No respiratory distress.   Musculoskeletal:         General: No deformity. Normal range of motion.      Cervical back: Neck supple.   Skin:     General: Skin is warm and dry.      Coloration: Skin is not pale.   Neurological:      Mental Status: She is alert and oriented to person, place, and time.      Motor: No weakness.   Psychiatric:         Behavior: Behavior normal. Behavior is cooperative.     POCT Cepheid Group A Strep by PCR: negative      Assessment/Plan:     1. Sore throat  - POCT GROUP A STREP, PCR  - ibuprofen (MOTRIN) 800 MG Tab; Take 1 Tablet by mouth every 8 hours as needed for Moderate Pain or Inflammation.  Dispense: 30 Tablet; Refill: 0  - Benzocaine-Menthol 15-2.3 MG Lozenge; Dissolve 1 Lozenge in the mouth every 2 hours as needed (Sore throat).  Dispense: 16 Lozenge; Refill: 0    2. Viral URI    Discussed likely " self-limiting viral etiology and expected course and duration of illness. Vital signs stable, afebrile, no acute distress at this time.     Differential diagnosis, natural history, supportive care, rest, fluids, gargles, over-the-counter symptom management per 's instructions, close monitoring, and indications for immediate follow-up discussed.     Rx as above sent electronically.    Monitor. Return precautions advised.    All questions answered. Patient agrees with the plan of care.    Discharge summary provided.

## 2025-06-14 ENCOUNTER — OFFICE VISIT (OUTPATIENT)
Dept: URGENT CARE | Facility: CLINIC | Age: 35
End: 2025-06-14
Payer: COMMERCIAL

## 2025-06-14 VITALS
TEMPERATURE: 97.9 F | DIASTOLIC BLOOD PRESSURE: 74 MMHG | RESPIRATION RATE: 16 BRPM | BODY MASS INDEX: 24.8 KG/M2 | HEIGHT: 63 IN | SYSTOLIC BLOOD PRESSURE: 110 MMHG | HEART RATE: 74 BPM | OXYGEN SATURATION: 97 % | WEIGHT: 140 LBS

## 2025-06-14 DIAGNOSIS — J06.9 VIRAL URI: Primary | ICD-10-CM

## 2025-06-14 PROCEDURE — 3078F DIAST BP <80 MM HG: CPT | Performed by: PHYSICIAN ASSISTANT

## 2025-06-14 PROCEDURE — 99213 OFFICE O/P EST LOW 20 MIN: CPT | Performed by: PHYSICIAN ASSISTANT

## 2025-06-14 PROCEDURE — 3074F SYST BP LT 130 MM HG: CPT | Performed by: PHYSICIAN ASSISTANT

## 2025-06-14 RX ORDER — ONDANSETRON 4 MG/1
4 TABLET, ORALLY DISINTEGRATING ORAL ONCE
Status: COMPLETED | OUTPATIENT
Start: 2025-06-14 | End: 2025-06-14

## 2025-06-14 RX ORDER — ONDANSETRON 4 MG/1
4 TABLET, ORALLY DISINTEGRATING ORAL EVERY 8 HOURS PRN
Qty: 15 TABLET | Refills: 0 | Status: SHIPPED | OUTPATIENT
Start: 2025-06-14

## 2025-06-14 RX ADMIN — ONDANSETRON 4 MG: 4 TABLET, ORALLY DISINTEGRATING ORAL at 17:34

## 2025-06-14 ASSESSMENT — ENCOUNTER SYMPTOMS
MYALGIAS: 1
NAUSEA: 1
ABDOMINAL PAIN: 0
SWOLLEN GLANDS: 0
VERTIGO: 0
CHANGE IN BOWEL HABIT: 0
FEVER: 1
SORE THROAT: 0
COUGH: 1
DIARRHEA: 0
HEADACHES: 1
ANOREXIA: 0
CHILLS: 0
VOMITING: 1
FATIGUE: 1

## 2025-06-15 NOTE — PROGRESS NOTES
Subjective:   Melissa Kirkland is a 34 y.o. female who presents for No chief complaint on file.    Patient presents to the clinic for complaints of ***  Has taken ***  Patient denies ***    HPI    ROS  Refer to HPI for additional details.    During this visit, appropriate PPE was worn, and hand hygiene was performed.    PMH:  has no past medical history of Addisons disease (HCC), Adrenal disorder (HCC), Allergy, Anemia, Anxiety, Blood transfusion, Clotting disorder (HCC), COPD, Cushings syndrome (HCC), Diabetic neuropathy (HCC), GERD (gastroesophageal reflux disease), Hyperlipidemia, IBD (inflammatory bowel disease), Meningitis, Migraine, Muscle disorder, OSTEOPOROSIS, Parathyroid disorder (HCC), Pituitary disease (HCC), Sickle cell disease (HCC), Substance abuse (HCC), or Ulcer.    MEDS: Current Medications[1]    ALLERGIES: Allergies[2]  SURGHX: Past Surgical History[3]  SOCHX:  reports that she has never smoked. She has never used smokeless tobacco. She reports that she does not drink alcohol and does not use drugs.    FH: Per HPI as applicable/pertinent.    Medications, Allergies, and current problem list reviewed today in Epic.     Objective:     There were no vitals taken for this visit.    Physical Exam    Assessment/Plan:     Diagnosis and associated orders:     There are no diagnoses linked to this encounter.   Comments/MDM:     Discussed that likely etiology of the patient's symptoms is ***  Patient agreeable to this plan of care.  All questions answered.  Return to clinic if symptoms persist or worsen.  Red flag symptoms warranting emergency medical services discussed, including but not limited to ***         Differential diagnosis, natural history, supportive care, and indications for immediate follow-up discussed.    Advised the patient to follow-up with the primary care physician for recheck, reevaluation, and consideration of further management.    Instructed patient to seek emergency  medical attention via calling EMS or going to the Emergency Room for red flag symptoms, including but not limited to: chest pain, palpitations, fever greater than 103F, shortness of breath, wheezing, new or worsened numbness/tingling, focal or unilateral weakness, and bloody vomit/stool.     Please note that this dictation was created using voice recognition software. I have made a reasonable attempt to correct obvious errors, but I expect that there are errors of grammar and possibly content that I did not discover before finalizing the note.    This note was electronically signed by TRUNG Baker           [1]   Current Outpatient Medications:     ibuprofen (MOTRIN) 800 MG Tab, Take 1 Tablet by mouth every 8 hours as needed for Moderate Pain or Inflammation., Disp: 30 Tablet, Rfl: 0    Benzocaine-Menthol 15-2.3 MG Lozenge, Dissolve 1 Lozenge in the mouth every 2 hours as needed (Sore throat)., Disp: 16 Lozenge, Rfl: 0  [2] No Known Allergies  [3]   Past Surgical History:  Procedure Laterality Date    MAMMOPLASTY AUGMENTATION Bilateral 4/25/2016    Procedure: MAMMOPLASTY AUGMENTATION WITH SILICONE IMPLANTS;  Surgeon: Ant Bunn M.D.;  Location: SURGERY [facility];  Service:     OTHER  2015    breast implants

## 2025-06-15 NOTE — PROGRESS NOTES
"Subjective     Melissa Kirkland is a 34 y.o. female who presents with Nausea (Sx started 1 week ago, nausea, vomiting, fever, dizzy, loss of appetite, )            Nausea  This is a new problem. Episode onset: 6 days ago. The problem occurs intermittently. The problem has been waxing and waning. Associated symptoms include coughing, fatigue, a fever (Reports subjected fever last night), headaches, myalgias, nausea and vomiting (when riding in the car- motion sickness). Pertinent negatives include no abdominal pain, anorexia, change in bowel habit, chills, congestion, rash, sore throat, swollen glands or vertigo. Nothing aggravates the symptoms. She has tried nothing for the symptoms.       Patient just travelled from [place] and got back today.       Past Medical History[1]    Past Surgical History[2]    Family History   Problem Relation Age of Onset    Hypertension Mother     Hyperlipidemia Father     No Known Problems Sister     No Known Problems Brother        Patient has no known allergies.    Medications, Allergies, and current problem list reviewed today in Epic    Review of Systems   Constitutional:  Positive for fatigue, fever (Reports subjected fever last night) and malaise/fatigue. Negative for chills.   HENT:  Negative for congestion, ear pain and sore throat.    Respiratory:  Positive for cough.    Gastrointestinal:  Positive for nausea and vomiting (when riding in the car- motion sickness). Negative for abdominal pain, anorexia, change in bowel habit and diarrhea.   Musculoskeletal:  Positive for myalgias.   Skin:  Negative for rash.   Neurological:  Positive for headaches. Negative for vertigo.          All other systems reviewed and are negative.       Objective     /74   Pulse 74   Temp 36.6 °C (97.9 °F) (Temporal)   Resp 16   Ht 1.6 m (5' 3\")   Wt 63.5 kg (140 lb)   SpO2 97%   BMI 24.80 kg/m²      Physical Exam  Constitutional:       General: She is not in acute " distress.     Appearance: She is not ill-appearing.   HENT:      Head: Normocephalic and atraumatic.      Nose: Nose normal.      Mouth/Throat:      Mouth: Mucous membranes are moist.      Pharynx: No oropharyngeal exudate or posterior oropharyngeal erythema.   Eyes:      Conjunctiva/sclera: Conjunctivae normal.   Cardiovascular:      Rate and Rhythm: Normal rate and regular rhythm.      Heart sounds: Normal heart sounds. No murmur heard.  Pulmonary:      Effort: Pulmonary effort is normal. No respiratory distress.      Breath sounds: No stridor. No wheezing, rhonchi or rales.   Abdominal:      General: There is no distension.      Palpations: Abdomen is soft. There is no mass.      Tenderness: There is no abdominal tenderness. There is no guarding or rebound.      Hernia: No hernia is present.   Skin:     General: Skin is warm and dry.   Neurological:      General: No focal deficit present.      Mental Status: She is alert and oriented to person, place, and time.      Cranial Nerves: No cranial nerve deficit.      Sensory: No sensory deficit.      Comments: Sonja hallpike negative   Psychiatric:         Mood and Affect: Mood normal.         Behavior: Behavior normal.         Thought Content: Thought content normal.         Judgment: Judgment normal.                                  Assessment & Plan  Viral URI  Physical exam without signs of bacteria illness. Viral URI complicated by motion sickness.  Abdominal exam and lung exam benign  .Viral etiology discussed. Continue conservative treatment.  Push fluids. [place] diet.  Orders:    ondansetron (ZOFRAN ODT) 4 MG TABLET DISPERSIBLE; Take 1 Tablet by mouth every 8 hours as needed for Nausea/Vomiting.    ondansetron (Zofran ODT) dispertab 4 mg     Differential diagnoses, Supportive care, and indications for immediate follow-up discussed with patient.   Pathogenesis of diagnosis discussed including typical length and natural progression.   Instructed to return to clinic  or nearest emergency department for any change in condition, further concerns, or worsening of symptoms.    The patient demonstrated a good understanding and agreed with the treatment plan.    Norma Mccormick P.A.-C.                   [1] History reviewed. No pertinent past medical history.  [2]   Past Surgical History:  Procedure Laterality Date    MAMMOPLASTY AUGMENTATION Bilateral 4/25/2016    Procedure: MAMMOPLASTY AUGMENTATION WITH SILICONE IMPLANTS;  Surgeon: Ant Bunn M.D.;  Location: SURGERY [facility];  Service:     OTHER  2015    breast implants

## 2025-06-17 ENCOUNTER — HOSPITAL ENCOUNTER (EMERGENCY)
Facility: MEDICAL CENTER | Age: 35
End: 2025-06-17
Attending: EMERGENCY MEDICINE
Payer: COMMERCIAL

## 2025-06-17 VITALS
SYSTOLIC BLOOD PRESSURE: 109 MMHG | DIASTOLIC BLOOD PRESSURE: 70 MMHG | OXYGEN SATURATION: 97 % | BODY MASS INDEX: 24.8 KG/M2 | HEIGHT: 63 IN | HEART RATE: 80 BPM | WEIGHT: 139.99 LBS | TEMPERATURE: 98.7 F | RESPIRATION RATE: 18 BRPM

## 2025-06-17 DIAGNOSIS — R11.2 NAUSEA AND VOMITING, UNSPECIFIED VOMITING TYPE: Primary | ICD-10-CM

## 2025-06-17 LAB
ALBUMIN SERPL BCP-MCNC: 4.2 G/DL (ref 3.2–4.9)
ALBUMIN/GLOB SERPL: 1.4 G/DL
ALP SERPL-CCNC: 59 U/L (ref 30–99)
ALT SERPL-CCNC: 12 U/L (ref 2–50)
ANION GAP SERPL CALC-SCNC: 13 MMOL/L (ref 7–16)
AST SERPL-CCNC: 15 U/L (ref 12–45)
BASOPHILS # BLD AUTO: 0.2 % (ref 0–1.8)
BASOPHILS # BLD: 0.01 K/UL (ref 0–0.12)
BILIRUB SERPL-MCNC: 0.5 MG/DL (ref 0.1–1.5)
BUN SERPL-MCNC: 7 MG/DL (ref 8–22)
CALCIUM ALBUM COR SERPL-MCNC: 8.7 MG/DL (ref 8.5–10.5)
CALCIUM SERPL-MCNC: 8.9 MG/DL (ref 8.5–10.5)
CHLORIDE SERPL-SCNC: 104 MMOL/L (ref 96–112)
CO2 SERPL-SCNC: 21 MMOL/L (ref 20–33)
CREAT SERPL-MCNC: 0.65 MG/DL (ref 0.5–1.4)
EOSINOPHIL # BLD AUTO: 0.04 K/UL (ref 0–0.51)
EOSINOPHIL NFR BLD: 0.9 % (ref 0–6.9)
ERYTHROCYTE [DISTWIDTH] IN BLOOD BY AUTOMATED COUNT: 38.1 FL (ref 35.9–50)
GFR SERPLBLD CREATININE-BSD FMLA CKD-EPI: 119 ML/MIN/1.73 M 2
GLOBULIN SER CALC-MCNC: 3.1 G/DL (ref 1.9–3.5)
GLUCOSE SERPL-MCNC: 92 MG/DL (ref 65–99)
HCG SERPL QL: NEGATIVE
HCT VFR BLD AUTO: 42.8 % (ref 37–47)
HGB BLD-MCNC: 14.2 G/DL (ref 12–16)
IMM GRANULOCYTES # BLD AUTO: 0.01 K/UL (ref 0–0.11)
IMM GRANULOCYTES NFR BLD AUTO: 0.2 % (ref 0–0.9)
LIPASE SERPL-CCNC: 27 U/L (ref 11–82)
LYMPHOCYTES # BLD AUTO: 1.18 K/UL (ref 1–4.8)
LYMPHOCYTES NFR BLD: 26.4 % (ref 22–41)
MCH RBC QN AUTO: 29 PG (ref 27–33)
MCHC RBC AUTO-ENTMCNC: 33.2 G/DL (ref 32.2–35.5)
MCV RBC AUTO: 87.3 FL (ref 81.4–97.8)
MONOCYTES # BLD AUTO: 0.4 K/UL (ref 0–0.85)
MONOCYTES NFR BLD AUTO: 8.9 % (ref 0–13.4)
NEUTROPHILS # BLD AUTO: 2.83 K/UL (ref 1.82–7.42)
NEUTROPHILS NFR BLD: 63.4 % (ref 44–72)
NRBC # BLD AUTO: 0 K/UL
NRBC BLD-RTO: 0 /100 WBC (ref 0–0.2)
PLATELET # BLD AUTO: 297 K/UL (ref 164–446)
PMV BLD AUTO: 10 FL (ref 9–12.9)
POTASSIUM SERPL-SCNC: 3.9 MMOL/L (ref 3.6–5.5)
PROT SERPL-MCNC: 7.3 G/DL (ref 6–8.2)
RBC # BLD AUTO: 4.9 M/UL (ref 4.2–5.4)
SODIUM SERPL-SCNC: 138 MMOL/L (ref 135–145)
WBC # BLD AUTO: 4.5 K/UL (ref 4.8–10.8)

## 2025-06-17 PROCEDURE — 85025 COMPLETE CBC W/AUTO DIFF WBC: CPT

## 2025-06-17 PROCEDURE — 99283 EMERGENCY DEPT VISIT LOW MDM: CPT | Mod: EDC

## 2025-06-17 PROCEDURE — 84703 CHORIONIC GONADOTROPIN ASSAY: CPT

## 2025-06-17 PROCEDURE — 80053 COMPREHEN METABOLIC PANEL: CPT

## 2025-06-17 PROCEDURE — 83690 ASSAY OF LIPASE: CPT

## 2025-06-17 PROCEDURE — 700111 HCHG RX REV CODE 636 W/ 250 OVERRIDE (IP): Performed by: EMERGENCY MEDICINE

## 2025-06-17 PROCEDURE — 36415 COLL VENOUS BLD VENIPUNCTURE: CPT | Mod: EDC

## 2025-06-17 RX ORDER — ONDANSETRON 4 MG/1
4 TABLET, ORALLY DISINTEGRATING ORAL ONCE
Status: COMPLETED | OUTPATIENT
Start: 2025-06-17 | End: 2025-06-17

## 2025-06-17 RX ORDER — PROMETHAZINE HYDROCHLORIDE 25 MG/1
25 TABLET ORAL EVERY 6 HOURS PRN
Qty: 10 TABLET | Refills: 0 | Status: SHIPPED | OUTPATIENT
Start: 2025-06-17

## 2025-06-17 RX ADMIN — ONDANSETRON 4 MG: 4 TABLET, ORALLY DISINTEGRATING ORAL at 07:50

## 2025-06-17 ASSESSMENT — LIFESTYLE VARIABLES
EVER HAD A DRINK FIRST THING IN THE MORNING TO STEADY YOUR NERVES TO GET RID OF A HANGOVER: NO
DO YOU DRINK ALCOHOL: YES
CONSUMPTION TOTAL: INCOMPLETE
TOTAL SCORE: 0
TOTAL SCORE: 0
EVER FELT BAD OR GUILTY ABOUT YOUR DRINKING: NO
HAVE YOU EVER FELT YOU SHOULD CUT DOWN ON YOUR DRINKING: NO
HAVE PEOPLE ANNOYED YOU BY CRITICIZING YOUR DRINKING: NO
TOTAL SCORE: 0

## 2025-06-17 NOTE — ED NOTES
"[facility]    Discharge instructions, which include signs and symptoms to monitor patient for, as well as detailed information regarding nausea and vomiting provided.  All questions and concerns addressed at this time.  Patient provided education on when to return to the ER included, but not limited to, uncontrolled pain/ fevers when medicating with motrin and tylenol, persistent vomiting, bloody vomit, black/ bloody stools, signs and symptoms of dehydration, and difficulty breathing.  Patient advised to follow up with PCP and verbally understands with no concerns.  Patient advised on setting up MyChart and information provided about patient survey.  Prescription for PHENERGAN sent to patient's preferred pharmacy.  Administration instructions provided.  Work note provided.    Patient leaves ER in no apparent distress. This RN provided education regarding returning to the ER for any new concerns or changes in patient's condition.      /70   Pulse 80   Temp 37.1 °C (98.7 °F) (Temporal)   Resp 18   Ht 1.6 m (5' 3\")   Wt 63.5 kg (139 lb 15.9 oz)   LMP 06/10/2025 (Approximate)   SpO2 97%   BMI 24.80 kg/m²   "

## 2025-06-17 NOTE — ED NOTES
Patient denies any fevers, URI symptoms, diarrhea, or recent trauma.  Patient states feeling nauseous at night and waking up dizzy and gagging however not vomiting.  Patient denies pregnancy and states took negative pregnancy test.  Patient provided with gown and no further needs or concerns at this time.

## 2025-06-17 NOTE — ED PROVIDER NOTES
ED Provider Note    CHIEF COMPLAINT  Chief Complaint   Patient presents with    N/V     Pt reports n/v since Saturday. Pt seen at  diagnosed with URI and prescribed Zofran. Pt reports Zofran works a little but not at all during the night. Pt not able to keep anything down x3 days. Endorses abd cramping.        EXTERNAL RECORDS REVIEWED  Reviewed previous obstetrical records    HPI/ROS  LIMITATION TO HISTORY   None  OUTSIDE HISTORIAN(S):  None    Melissa Kirkland is a 34 y.o. female who presents for evaluation of ongoing nausea vomiting with some nonbloody diarrhea.  Symptoms have been present for 3 to 4 days.  She went to urgent care and was prescribed Zofran which helps during the day but not at night.  Other family members have been ill with a similar condition.  She denies pregnancy.  No abdominal pain.  She has been making urine and able to keep liquids down.  No dysuria or hematuria.  No report of any high fevers or rash.  No report of any exotic or foreign travel or recent antibiotic use or drinking untreated water.    PAST MEDICAL HISTORY   No significant medical history    SURGICAL HISTORY   has a past surgical history that includes mammoplasty augmentation (Bilateral, 4/25/2016) and other (2015).    FAMILY HISTORY  Family History   Problem Relation Age of Onset    Hypertension Mother     Hyperlipidemia Father     No Known Problems Sister     No Known Problems Brother        SOCIAL HISTORY  Social History     Tobacco Use    Smoking status: Never    Smokeless tobacco: Never   Vaping Use    Vaping status: Never Used   Substance and Sexual Activity    Alcohol use: No    Drug use: No    Sexual activity: Yes     Partners: Male     Comment: none       CURRENT MEDICATIONS  Home Medications       Reviewed by Iris Zaidi R.N. (Registered Nurse) on 06/17/25 at 0631  Med List Status: Not Addressed     Medication Last Dose Status   Benzocaine-Menthol 15-2.3 MG Lozenge  Active   ibuprofen  "(MOTRIN) 800 MG Tab  Active   ondansetron (ZOFRAN ODT) 4 MG TABLET DISPERSIBLE  Active                    ALLERGIES  Allergies[1]    PHYSICAL EXAM  VITAL SIGNS: /76   Pulse 87   Temp 35.8 °C (96.5 °F) (Temporal)   Resp 16   Ht 1.6 m (5' 3\")   Wt 63.5 kg (139 lb 15.9 oz)   LMP 06/10/2025 (Approximate)   SpO2 94%   BMI 24.80 kg/m²    Pulse ox interpretation: I interpret this pulse ox as normal.  Constitutional: Alert and oriented x 3, no acute distress  HEENT: Atraumatic normocephalic, pupils are equal round reactive to light extraocular movements are intact. The nares is clear, external ears are normal, mouth shows moist mucous membranes normal dentition for age  Neck: Supple, no JVD no tracheal deviation  Cardiovascular: Regular rate and rhythm no murmur rub or gallop 2+ pulses peripherally x4  Thorax & Lungs: No respiratory distress, no wheezes rales or rhonchi, No chest tenderness.   GI: Soft no rebound guarding or rigidity no palpable hernias or masses no peritoneal signs   skin: Warm dry no acute rash or lesion capillary refill less than 2 seconds  Musculoskeletal: Moving all extremities with full range and 5 of 5 strength no acute  deformity  Neurologic: Cranial nerves III through XII are grossly intact no sensory deficit no cerebellar dysfunction   Psychiatric: Appropriate affect for situation at this time          EKG/LABS  Results for orders placed or performed during the hospital encounter of 06/17/25   CBC WITH DIFFERENTIAL    Collection Time: 06/17/25  6:44 AM   Result Value Ref Range    WBC 4.5 (L) 4.8 - 10.8 K/uL    RBC 4.90 4.20 - 5.40 M/uL    Hemoglobin 14.2 12.0 - 16.0 g/dL    Hematocrit 42.8 37.0 - 47.0 %    MCV 87.3 81.4 - 97.8 fL    MCH 29.0 27.0 - 33.0 pg    MCHC 33.2 32.2 - 35.5 g/dL    RDW 38.1 35.9 - 50.0 fL    Platelet Count 297 164 - 446 K/uL    MPV 10.0 9.0 - 12.9 fL    Neutrophils-Polys 63.40 44.00 - 72.00 %    Lymphocytes 26.40 22.00 - 41.00 %    Monocytes 8.90 0.00 - 13.40 " %    Eosinophils 0.90 0.00 - 6.90 %    Basophils 0.20 0.00 - 1.80 %    Immature Granulocytes 0.20 0.00 - 0.90 %    Nucleated RBC 0.00 0.00 - 0.20 /100 WBC    Neutrophils (Absolute) 2.83 1.82 - 7.42 K/uL    Lymphs (Absolute) 1.18 1.00 - 4.80 K/uL    Monos (Absolute) 0.40 0.00 - 0.85 K/uL    Eos (Absolute) 0.04 0.00 - 0.51 K/uL    Baso (Absolute) 0.01 0.00 - 0.12 K/uL    Immature Granulocytes (abs) 0.01 0.00 - 0.11 K/uL    NRBC (Absolute) 0.00 K/uL   COMP METABOLIC PANEL    Collection Time: 06/17/25  6:44 AM   Result Value Ref Range    Sodium 138 135 - 145 mmol/L    Potassium 3.9 3.6 - 5.5 mmol/L    Chloride 104 96 - 112 mmol/L    Co2 21 20 - 33 mmol/L    Anion Gap 13.0 7.0 - 16.0    Glucose 92 65 - 99 mg/dL    Bun 7 (L) 8 - 22 mg/dL    Creatinine 0.65 0.50 - 1.40 mg/dL    Calcium 8.9 8.5 - 10.5 mg/dL    Correct Calcium 8.7 8.5 - 10.5 mg/dL    AST(SGOT) 15 12 - 45 U/L    ALT(SGPT) 12 2 - 50 U/L    Alkaline Phosphatase 59 30 - 99 U/L    Total Bilirubin 0.5 0.1 - 1.5 mg/dL    Albumin 4.2 3.2 - 4.9 g/dL    Total Protein 7.3 6.0 - 8.2 g/dL    Globulin 3.1 1.9 - 3.5 g/dL    A-G Ratio 1.4 g/dL   LIPASE    Collection Time: 06/17/25  6:44 AM   Result Value Ref Range    Lipase 27 11 - 82 U/L   HCG QUAL SERUM    Collection Time: 06/17/25  6:44 AM   Result Value Ref Range    Beta-Hcg Qualitative Serum Negative Negative   ESTIMATED GFR    Collection Time: 06/17/25  6:44 AM   Result Value Ref Range    GFR (CKD-EPI) 119 >60 mL/min/1.73 m 2        RADIOLOGY/PROCEDURES   Considered abdominal imaging      COURSE & MEDICAL DECISION MAKING    ASSESSMENT, COURSE AND PLAN  Care Narrative:    This is a well-appearing 34-year-old otherwise healthy female presents with nausea vomiting diarrhea for 3 to 4 days.  On exam her vital signs are reassuring.  She did not have any high fever hypotension tachycardia hypoxia or increased work of breathing.  On physical exam she did not appear to be grossly dehydrated and had no signs of peritonitis on  abdominal exam.  Laboratory studies were performed in triage per protocol.  Her CBC demonstrates a slightly depressed white blood cell count but no anemia or bandemia.  Metabolic panel demonstrates normal kidney function normal LFTs and lipase and serum pregnancy is negative.  She was given sublingual Zofran and was able to tolerate greater than 10 ounces of clear liquids here.  With the illness going through the family I suspect this is norovirus.  This is not a easily testable condition.  I considered but did not feel that parenteral IV fluids were indicated or imaging at this time.  I will try some Phenergan at night as it may have some mild sedative effect and her nausea appears to be worse at night.  No clinical indication for antibiotics at this time      ADDITIONAL PROBLEMS MANAGED      DISPOSITION AND DISCUSSIONS  I have discussed management of the patient with the following physicians and [facility]'s: None    Discussion of management with other [facility] or appropriate source(s): None    Escalation of care considered, and ultimately not performed: Consider parenteral fluid therapy as well as imaging    Barriers to care at this time, including but not limited to: No PCP.     Decision tools and prescription drugs considered including, but not limited to: Patient will be prescribed Phenergan.    FINAL DIAGNOSIS  1. Nausea and vomiting, unspecified vomiting type         Electronically signed by: Arthur Jewell M.D., 6/17/2025 8:50 AM           [1] No Known Allergies

## 2025-06-17 NOTE — ED TRIAGE NOTES
"Chief Complaint   Patient presents with    N/V     Pt reports n/v since Saturday. Pt seen at  diagnosed with URI and prescribed Zofran. Pt reports Zofran works a little but not at all during the night. Pt not able to keep anything down x3 days. Endorses abd cramping.      Abd pain protocol ordered.     Pt is alert and oriented, speaking in full sentences, follows commands and responds appropriately to questions. Resperations are even and unlabored.      Pt placed in phleb area.     /76   Pulse 87   Temp 35.8 °C (96.5 °F) (Temporal)   Resp 16   Ht 1.6 m (5' 3\")   Wt 63.5 kg (139 lb 15.9 oz)   SpO2 94%      "

## 2025-06-30 ENCOUNTER — APPOINTMENT (OUTPATIENT)
Dept: RADIOLOGY | Facility: MEDICAL CENTER | Age: 35
End: 2025-06-30
Attending: EMERGENCY MEDICINE
Payer: COMMERCIAL

## 2025-06-30 ENCOUNTER — HOSPITAL ENCOUNTER (EMERGENCY)
Facility: MEDICAL CENTER | Age: 35
End: 2025-06-30
Attending: EMERGENCY MEDICINE
Payer: COMMERCIAL

## 2025-06-30 VITALS
SYSTOLIC BLOOD PRESSURE: 126 MMHG | HEIGHT: 62 IN | BODY MASS INDEX: 26.17 KG/M2 | HEART RATE: 93 BPM | OXYGEN SATURATION: 96 % | TEMPERATURE: 98 F | DIASTOLIC BLOOD PRESSURE: 68 MMHG | WEIGHT: 142.2 LBS | RESPIRATION RATE: 18 BRPM

## 2025-06-30 DIAGNOSIS — S09.90XA CLOSED HEAD INJURY, INITIAL ENCOUNTER: Primary | ICD-10-CM

## 2025-06-30 PROCEDURE — 700102 HCHG RX REV CODE 250 W/ 637 OVERRIDE(OP): Performed by: EMERGENCY MEDICINE

## 2025-06-30 PROCEDURE — 70450 CT HEAD/BRAIN W/O DYE: CPT

## 2025-06-30 PROCEDURE — 72125 CT NECK SPINE W/O DYE: CPT

## 2025-06-30 PROCEDURE — A9270 NON-COVERED ITEM OR SERVICE: HCPCS | Performed by: EMERGENCY MEDICINE

## 2025-06-30 PROCEDURE — 99283 EMERGENCY DEPT VISIT LOW MDM: CPT

## 2025-06-30 RX ORDER — ACETAMINOPHEN 325 MG/1
650 TABLET ORAL ONCE
Status: COMPLETED | OUTPATIENT
Start: 2025-06-30 | End: 2025-06-30

## 2025-06-30 RX ADMIN — ACETAMINOPHEN 650 MG: 325 TABLET ORAL at 14:02

## 2025-06-30 ASSESSMENT — FIBROSIS 4 INDEX: FIB4 SCORE: 0.48

## 2025-06-30 ASSESSMENT — PAIN DESCRIPTION - PAIN TYPE
TYPE: ACUTE PAIN

## 2025-06-30 NOTE — ED TRIAGE NOTES
"  Chief Complaint   Patient presents with    Head Injury     At work today big box feel on patient back of head around 1015. Pt states yellow fluid came out of her nose. -LOC, -thinner    Headache     Headache since the head injury. Endorse neck pain 2/10        33 yo  female to triage for above complaint.     Pt is alert and orientated, speaking full sentences, follows commands and responds appropriately  to questions.     Patient placed back lobby and educated on triage process. Asked to inform RN of any changes or concerns.     BP (!) 125/96   Pulse 69   Temp 36.8 °C (98.2 °F) (Temporal)   Resp 16   Ht 1.575 m (5' 2\")   Wt 64.5 kg (142 lb 3.2 oz)   LMP 06/10/2025 (Approximate)   SpO2 97%   BMI 26.01 kg/m²       No past medical history on file.    "

## 2025-06-30 NOTE — ED PROVIDER NOTES
ED Provider Note    CHIEF COMPLAINT  Chief Complaint   Patient presents with    Head Injury     At work today big box feel on patient back of head around 1015. Pt states yellow fluid came out of her nose. -LOC, -thinner    Headache     Headache since the head injury. Endorse neck pain 2/10       EXTERNAL RECORDS REVIEWED      HPI/ROS  LIMITATION TO HISTORY     OUTSIDE HISTORIAN(S):      Melissa Kirkland is a 34 y.o. female who presents to the emergency department with chief complaint of head injury.  Patient today had a large heavy box fall off a shelf and hit her in the back of the head.  Patient states she was not knocked out but she was stunned she has had moderate posterior headache since that time.  She reports that she became concerned because she had some thin yellow fluid leak out of her nose immediately after the event.  No chest pain no shortness of breath no abdominal pain no other injury or concern at this time.    PAST MEDICAL HISTORY   Patient denies.    SURGICAL HISTORY   has a past surgical history that includes mammoplasty augmentation (Bilateral, 4/25/2016) and other (2015).    FAMILY HISTORY  Family History   Problem Relation Age of Onset    Hypertension Mother     Hyperlipidemia Father     No Known Problems Sister     No Known Problems Brother        SOCIAL HISTORY  Social History     Tobacco Use    Smoking status: Never    Smokeless tobacco: Never   Vaping Use    Vaping status: Never Used   Substance and Sexual Activity    Alcohol use: No    Drug use: No    Sexual activity: Yes     Partners: Male     Comment: none       CURRENT MEDICATIONS  Home Medications       Reviewed by Saadia Bettencourt R.N. (Registered Nurse) on 06/30/25 at 1208  Med List Status: <None>     Medication Last Dose Status   Benzocaine-Menthol 15-2.3 MG Lozenge  Active   ibuprofen (MOTRIN) 800 MG Tab  Active   ondansetron (ZOFRAN ODT) 4 MG TABLET DISPERSIBLE  Active   promethazine (PHENERGAN) 25 MG Tab   "Active                    ALLERGIES  Allergies[1]    PHYSICAL EXAM  VITAL SIGNS: BP (!) 125/96   Pulse 69   Temp 36.8 °C (98.2 °F) (Temporal)   Resp 16   Ht 1.575 m (5' 2\")   Wt 64.5 kg (142 lb 3.2 oz)   LMP 06/10/2025 (Approximate)   SpO2 97%   BMI 26.01 kg/m²      Pulse ox interpretation: I interpret this pulse ox as normal.  Constitutional: Alert and oriented x 3, minimal distress  HEENT: Minor tenderness along the occiput without outward sign of trauma normocephalic, pupils are equal round reactive to light extraocular movements are intact. The nares is clear, external ears are normal, mouth shows moist mucous membranes normal dentition for age no hemotympanum no obvious CSF rhinorrhea  Neck: Supple, no JVD no tracheal deviation  Cardiovascular: Regular rate and rhythm no murmur rub or gallop 2+ pulses peripherally x4  Thorax & Lungs: No respiratory distress, no wheezes rales or rhonchi, No chest tenderness.   GI: Soft nontender nondistended positive bowel sounds, no peritoneal signs  Skin: Warm dry no acute rash or lesion  Musculoskeletal: Moving all extremities with full range and 5 of 5 strength no acute  deformity  Neurologic: Cranial nerves III through XII are grossly intact no sensory deficit no cerebellar dysfunction   Psychiatric: Appropriate affect for situation at this time          RADIOLOGY/PROCEDURES   I have independently interpreted the diagnostic imaging associated with this visit and am waiting the final reading from the radiologist.   My preliminary interpretation is as follows: No acute intracranial hemorrhage    Radiologist interpretation:  No orders to display       COURSE & MEDICAL DECISION MAKING    ASSESSMENT, COURSE AND PLAN  Care Narrative: Patient had 1 second event where she had some minor drainage of clear yellowish fluid from her nose.  She has had no further drainage the entire team throughout the ER she has normal vital signs her head CT and CT C-spine are negative for " "acute fracture including any basilar skull fracture.  She did have some very scant fluid in the right side of maxillary sinus.  I discussed this with neurosurgery in an abundance of caution I think the chance of the patient actually had any type of CSF rhinorrhea is very low at this time however myself and neurosurgery are both in agreement that at this time there would be no intervention on such.  Patient given strict return precautions to return should she develop fevers worsening headache nauseousness vomiting altered mental status dizziness severe back pain severe drainage from her nose any other acute symptom change or concern otherwise discharged in stable improved condition.          Discussion of management with other [facility] or appropriate source(s):      Escalation of care considered, and ultimately not performed:Laboratory analysis and acute inpatient care management, however at this time, the patient is most appropriate for outpatient management    Barriers to care at this time, including but not limited to: .     Decision tools and prescription drugs considered including, but not limited to: .  /68   Pulse 93   Temp 36.7 °C (98 °F) (Temporal)   Resp 18   Ht 1.575 m (5' 2\")   Wt 64.5 kg (142 lb 3.2 oz)   LMP 06/10/2025 (Approximate)   SpO2 96%   Breastfeeding No   BMI 26.01 kg/m²     [facility]  [address], [facility] [address]  [facility] [address] [phone]  Schedule an appointment as soon as possible for a visit   for establishment of primary care, For repeat head injury exam    [facility], Emergency Dept  [address] [facility] [address] [phone]    in 12-24 hours if symptoms persist, immediately If symptoms worsen, or if you develop any other symptoms or concerns      FINAL DIAGNOSIS  1. Closed head injury, initial encounter Active        Electronically signed by: Catracho Sullivan M.D., 6/30/2025 12:32 PM           [1] No Known " Allergies

## 2025-06-30 NOTE — ED NOTES
Pt assisted to room with steady gait. Agree with triage note. Pt denies further leaking of fluid from nostril.